# Patient Record
Sex: MALE | Race: WHITE | Employment: FULL TIME | URBAN - METROPOLITAN AREA
[De-identification: names, ages, dates, MRNs, and addresses within clinical notes are randomized per-mention and may not be internally consistent; named-entity substitution may affect disease eponyms.]

---

## 2021-08-19 ENCOUNTER — APPOINTMENT (OUTPATIENT)
Dept: CT IMAGING | Age: 61
DRG: 057 | End: 2021-08-19
Payer: MEDICARE

## 2021-08-19 ENCOUNTER — APPOINTMENT (OUTPATIENT)
Dept: GENERAL RADIOLOGY | Age: 61
DRG: 057 | End: 2021-08-19
Payer: MEDICARE

## 2021-08-19 ENCOUNTER — HOSPITAL ENCOUNTER (INPATIENT)
Age: 61
LOS: 10 days | Discharge: ANOTHER HOSPITAL | DRG: 057 | End: 2021-08-29
Attending: EMERGENCY MEDICINE | Admitting: INTERNAL MEDICINE
Payer: MEDICARE

## 2021-08-19 DIAGNOSIS — R77.8 ELEVATED TROPONIN: ICD-10-CM

## 2021-08-19 DIAGNOSIS — R41.82 ALTERED MENTAL STATUS, UNSPECIFIED ALTERED MENTAL STATUS TYPE: Primary | ICD-10-CM

## 2021-08-19 PROBLEM — R79.89 ELEVATED TROPONIN: Status: ACTIVE | Noted: 2021-08-19

## 2021-08-19 LAB
ACETAMINOPHEN LEVEL: <5 MCG/ML (ref 10–30)
ALBUMIN SERPL-MCNC: 4.1 G/DL (ref 3.5–5.2)
ALP BLD-CCNC: 141 U/L (ref 40–129)
ALT SERPL-CCNC: 32 U/L (ref 0–40)
AMPHETAMINE SCREEN, URINE: NOT DETECTED
ANION GAP SERPL CALCULATED.3IONS-SCNC: 16 MMOL/L (ref 7–16)
AST SERPL-CCNC: 35 U/L (ref 0–39)
BACTERIA: ABNORMAL /HPF
BARBITURATE SCREEN URINE: NOT DETECTED
BASOPHILS ABSOLUTE: 0.03 E9/L (ref 0–0.2)
BASOPHILS RELATIVE PERCENT: 0.4 % (ref 0–2)
BENZODIAZEPINE SCREEN, URINE: NOT DETECTED
BILIRUB SERPL-MCNC: 0.6 MG/DL (ref 0–1.2)
BILIRUBIN URINE: NEGATIVE
BLOOD, URINE: NEGATIVE
BUN BLDV-MCNC: 17 MG/DL (ref 6–23)
CALCIUM SERPL-MCNC: 9.3 MG/DL (ref 8.6–10.2)
CANNABINOID SCREEN URINE: NOT DETECTED
CHLORIDE BLD-SCNC: 98 MMOL/L (ref 98–107)
CK MB: 6.3 NG/ML (ref 0–7.7)
CLARITY: CLEAR
CO2: 22 MMOL/L (ref 22–29)
COCAINE METABOLITE SCREEN URINE: NOT DETECTED
COLOR: YELLOW
CREAT SERPL-MCNC: 0.7 MG/DL (ref 0.7–1.2)
EOSINOPHILS ABSOLUTE: 0.12 E9/L (ref 0.05–0.5)
EOSINOPHILS RELATIVE PERCENT: 1.8 % (ref 0–6)
EPITHELIAL CELLS, UA: ABNORMAL /HPF
ETHANOL: <10 MG/DL (ref 0–0.08)
FENTANYL SCREEN, URINE: NOT DETECTED
GFR AFRICAN AMERICAN: >60
GFR NON-AFRICAN AMERICAN: >60 ML/MIN/1.73
GLUCOSE BLD-MCNC: 112 MG/DL (ref 74–99)
GLUCOSE URINE: NEGATIVE MG/DL
HCT VFR BLD CALC: 42.3 % (ref 37–54)
HEMOGLOBIN: 14.9 G/DL (ref 12.5–16.5)
IMMATURE GRANULOCYTES #: 0.02 E9/L
IMMATURE GRANULOCYTES %: 0.3 % (ref 0–5)
INFLUENZA A: NOT DETECTED
INFLUENZA B: NOT DETECTED
KETONES, URINE: 15 MG/DL
LEUKOCYTE ESTERASE, URINE: ABNORMAL
LYMPHOCYTES ABSOLUTE: 1.92 E9/L (ref 1.5–4)
LYMPHOCYTES RELATIVE PERCENT: 28.5 % (ref 20–42)
Lab: NORMAL
MAGNESIUM: 1.9 MG/DL (ref 1.6–2.6)
MCH RBC QN AUTO: 30.8 PG (ref 26–35)
MCHC RBC AUTO-ENTMCNC: 35.2 % (ref 32–34.5)
MCV RBC AUTO: 87.6 FL (ref 80–99.9)
METER GLUCOSE: 107 MG/DL (ref 74–99)
METHADONE SCREEN, URINE: NOT DETECTED
MONOCYTES ABSOLUTE: 0.52 E9/L (ref 0.1–0.95)
MONOCYTES RELATIVE PERCENT: 7.7 % (ref 2–12)
NEUTROPHILS ABSOLUTE: 4.13 E9/L (ref 1.8–7.3)
NEUTROPHILS RELATIVE PERCENT: 61.3 % (ref 43–80)
NITRITE, URINE: NEGATIVE
OPIATE SCREEN URINE: NOT DETECTED
OXYCODONE URINE: NOT DETECTED
PDW BLD-RTO: 13 FL (ref 11.5–15)
PH UA: 8 (ref 5–9)
PHENCYCLIDINE SCREEN URINE: NOT DETECTED
PLATELET # BLD: 203 E9/L (ref 130–450)
PMV BLD AUTO: 10.4 FL (ref 7–12)
POTASSIUM REFLEX MAGNESIUM: 3.2 MMOL/L (ref 3.5–5)
PROTEIN UA: NEGATIVE MG/DL
RBC # BLD: 4.83 E12/L (ref 3.8–5.8)
RBC UA: ABNORMAL /HPF (ref 0–2)
SALICYLATE, SERUM: <0.3 MG/DL (ref 0–30)
SARS-COV-2 RNA, RT PCR: NOT DETECTED
SODIUM BLD-SCNC: 136 MMOL/L (ref 132–146)
SPECIFIC GRAVITY UA: 1.01 (ref 1–1.03)
TOTAL CK: 566 U/L (ref 20–200)
TOTAL PROTEIN: 7.4 G/DL (ref 6.4–8.3)
TRICYCLIC ANTIDEPRESSANTS SCREEN SERUM: NEGATIVE NG/ML
TROPONIN, HIGH SENSITIVITY: 60 NG/L (ref 0–11)
UROBILINOGEN, URINE: 2 E.U./DL
WBC # BLD: 6.7 E9/L (ref 4.5–11.5)
WBC UA: ABNORMAL /HPF (ref 0–5)

## 2021-08-19 PROCEDURE — 6360000002 HC RX W HCPCS: Performed by: EMERGENCY MEDICINE

## 2021-08-19 PROCEDURE — 87636 SARSCOV2 & INF A&B AMP PRB: CPT

## 2021-08-19 PROCEDURE — 85025 COMPLETE CBC W/AUTO DIFF WBC: CPT

## 2021-08-19 PROCEDURE — 96372 THER/PROPH/DIAG INJ SC/IM: CPT

## 2021-08-19 PROCEDURE — 70450 CT HEAD/BRAIN W/O DYE: CPT

## 2021-08-19 PROCEDURE — 80143 DRUG ASSAY ACETAMINOPHEN: CPT

## 2021-08-19 PROCEDURE — 93005 ELECTROCARDIOGRAM TRACING: CPT | Performed by: EMERGENCY MEDICINE

## 2021-08-19 PROCEDURE — 82550 ASSAY OF CK (CPK): CPT

## 2021-08-19 PROCEDURE — 99284 EMERGENCY DEPT VISIT MOD MDM: CPT

## 2021-08-19 PROCEDURE — 82962 GLUCOSE BLOOD TEST: CPT

## 2021-08-19 PROCEDURE — 81001 URINALYSIS AUTO W/SCOPE: CPT

## 2021-08-19 PROCEDURE — 82077 ASSAY SPEC XCP UR&BREATH IA: CPT

## 2021-08-19 PROCEDURE — 71045 X-RAY EXAM CHEST 1 VIEW: CPT

## 2021-08-19 PROCEDURE — 80053 COMPREHEN METABOLIC PANEL: CPT

## 2021-08-19 PROCEDURE — 82553 CREATINE MB FRACTION: CPT

## 2021-08-19 PROCEDURE — 36415 COLL VENOUS BLD VENIPUNCTURE: CPT

## 2021-08-19 PROCEDURE — 96374 THER/PROPH/DIAG INJ IV PUSH: CPT

## 2021-08-19 PROCEDURE — 84484 ASSAY OF TROPONIN QUANT: CPT

## 2021-08-19 PROCEDURE — 83735 ASSAY OF MAGNESIUM: CPT

## 2021-08-19 PROCEDURE — 80307 DRUG TEST PRSMV CHEM ANLYZR: CPT

## 2021-08-19 PROCEDURE — 80179 DRUG ASSAY SALICYLATE: CPT

## 2021-08-19 PROCEDURE — 2060000000 HC ICU INTERMEDIATE R&B

## 2021-08-19 RX ORDER — POTASSIUM CHLORIDE 20 MEQ/1
40 TABLET, EXTENDED RELEASE ORAL PRN
Status: DISCONTINUED | OUTPATIENT
Start: 2021-08-19 | End: 2021-08-29 | Stop reason: HOSPADM

## 2021-08-19 RX ORDER — SODIUM CHLORIDE 0.9 % (FLUSH) 0.9 %
5-40 SYRINGE (ML) INJECTION EVERY 12 HOURS SCHEDULED
Status: DISCONTINUED | OUTPATIENT
Start: 2021-08-19 | End: 2021-08-29 | Stop reason: HOSPADM

## 2021-08-19 RX ORDER — ONDANSETRON 2 MG/ML
4 INJECTION INTRAMUSCULAR; INTRAVENOUS EVERY 6 HOURS PRN
Status: DISCONTINUED | OUTPATIENT
Start: 2021-08-19 | End: 2021-08-29 | Stop reason: HOSPADM

## 2021-08-19 RX ORDER — ONDANSETRON 4 MG/1
4 TABLET, ORALLY DISINTEGRATING ORAL EVERY 8 HOURS PRN
Status: DISCONTINUED | OUTPATIENT
Start: 2021-08-19 | End: 2021-08-29 | Stop reason: HOSPADM

## 2021-08-19 RX ORDER — HALOPERIDOL 5 MG/ML
5 INJECTION INTRAMUSCULAR ONCE
Status: COMPLETED | OUTPATIENT
Start: 2021-08-19 | End: 2021-08-19

## 2021-08-19 RX ORDER — AMLODIPINE BESYLATE 5 MG/1
5 TABLET ORAL DAILY
Status: DISCONTINUED | OUTPATIENT
Start: 2021-08-20 | End: 2021-08-29 | Stop reason: HOSPADM

## 2021-08-19 RX ORDER — LORAZEPAM 2 MG/ML
1 INJECTION INTRAMUSCULAR ONCE
Status: COMPLETED | OUTPATIENT
Start: 2021-08-19 | End: 2021-08-19

## 2021-08-19 RX ORDER — ACETAMINOPHEN 650 MG/1
650 SUPPOSITORY RECTAL EVERY 6 HOURS PRN
Status: DISCONTINUED | OUTPATIENT
Start: 2021-08-19 | End: 2021-08-29 | Stop reason: HOSPADM

## 2021-08-19 RX ORDER — POTASSIUM CHLORIDE 7.45 MG/ML
10 INJECTION INTRAVENOUS PRN
Status: DISCONTINUED | OUTPATIENT
Start: 2021-08-19 | End: 2021-08-29 | Stop reason: HOSPADM

## 2021-08-19 RX ORDER — NITROGLYCERIN 0.4 MG/1
0.4 TABLET SUBLINGUAL EVERY 5 MIN PRN
Status: DISCONTINUED | OUTPATIENT
Start: 2021-08-19 | End: 2021-08-29 | Stop reason: HOSPADM

## 2021-08-19 RX ORDER — ATORVASTATIN CALCIUM 40 MG/1
40 TABLET, FILM COATED ORAL NIGHTLY
Status: DISCONTINUED | OUTPATIENT
Start: 2021-08-19 | End: 2021-08-29 | Stop reason: HOSPADM

## 2021-08-19 RX ORDER — TRAMADOL HYDROCHLORIDE 50 MG/1
50 TABLET ORAL EVERY 6 HOURS PRN
Status: DISCONTINUED | OUTPATIENT
Start: 2021-08-19 | End: 2021-08-29 | Stop reason: HOSPADM

## 2021-08-19 RX ORDER — SODIUM CHLORIDE 9 MG/ML
25 INJECTION, SOLUTION INTRAVENOUS PRN
Status: DISCONTINUED | OUTPATIENT
Start: 2021-08-19 | End: 2021-08-29 | Stop reason: HOSPADM

## 2021-08-19 RX ORDER — ASPIRIN 81 MG/1
81 TABLET, CHEWABLE ORAL DAILY
Status: DISCONTINUED | OUTPATIENT
Start: 2021-08-20 | End: 2021-08-29 | Stop reason: HOSPADM

## 2021-08-19 RX ORDER — SODIUM CHLORIDE 0.9 % (FLUSH) 0.9 %
10 SYRINGE (ML) INJECTION PRN
Status: DISCONTINUED | OUTPATIENT
Start: 2021-08-19 | End: 2021-08-29 | Stop reason: HOSPADM

## 2021-08-19 RX ORDER — ACETAMINOPHEN 325 MG/1
650 TABLET ORAL EVERY 6 HOURS PRN
Status: DISCONTINUED | OUTPATIENT
Start: 2021-08-19 | End: 2021-08-29 | Stop reason: HOSPADM

## 2021-08-19 RX ADMIN — LORAZEPAM 1 MG: 2 INJECTION INTRAMUSCULAR; INTRAVENOUS at 16:15

## 2021-08-19 RX ADMIN — HALOPERIDOL LACTATE 5 MG: 5 INJECTION, SOLUTION INTRAMUSCULAR at 16:22

## 2021-08-19 ASSESSMENT — PAIN SCALES - GENERAL: PAINLEVEL_OUTOF10: 0

## 2021-08-19 NOTE — LETTER
[address] [place] [address] Dr Department Medicaid  CERTIFICATION OF NECESSITY  FOR NON-EMERGENCY TRANSPORTATION   BY GROUND AMBULANCE      Individual Information   1. Name: Saleem De Gu[facility] 2. [address] [place] [address] Dr Medicaid Billing Number:    3. Address: [address] [address]      Transportation Provider Information   4. Provider Name: pas   5. [address] [place] [address] Dr Medicaid Provider Number:  National Provider Identifier (NPI):      Certification  7. Criteria:  During transport, this individual requires:  [x] Medical treatment or continuous     supervision by an EMT. [] The administration or regulation of oxygen by another person. [] Supervised protective restraint. 8. Period Beginning Date: 2021     9. Length  [x] Not more than 10 day(s)  [] One Year     Additional Information Relevant to Certification   10. Comments or Explanations, If Necessary or Appropriate     AMS  Unsafe to travel unattended. Erratic behavior     Certifying Practitioner Information   11. Name of Practitioner: Lisa Schroeder MD   12. [address] [place] [address] Dr Medicaid Provider Number, If Applicable:  [address] Provider Identifier (NPI):      Signature Information   14. Date of Signature: 2021  15. Name of Person Signing: Demetrio Vivar RN    16. Signature and Professional Designation: Demetrio Vivar RN 2021 0825     [facility] [address]  Rev. 2015       [address] Encounter Date/Time: 2021 [address] Account: [de-identified]    MRN: [id number]    Patient: Criss Black    Contact Serial #: [id number]      ENCOUNTER          Patient Class: I Private Enc?   No Unit RM BD: [address] Service: Med/Surg   Encounter DX: Elevated troponin [R77.8]   ADM Provider: Johnnie Eason MD   Procedure:     ATT Provider: Johnnie Eason MD   REF Provider:        Admission DX: Elevated troponin, Altered mental status, unspecified altered mental status type and DX codes: R77.8, R41.82      PATIENT                 Name: Criss Black : 1960 (61 yrs) Address: [address] [place] [address] Sex: Male   [facility] [address]         Marital Status:    Employer: [employer]         Muslim: None   Primary Care Provider: Romeo Lamar MD         Primary Phone: [phone]   EMERGENCY CONTACT   Contact Name Legal Guardian? Relationship to Patient Home Phone Work Phone   1. Angela Rangel  2. *No Contact Specified*      Spouse    [phone]                 GUARANTOR            Guarantor: Desiree Pabon     : 1960   Address: [address] [place] [address] Sex: Male   [place] [id number]     Relation to Patient: Self       Home Phone: [phone]   Guarantor ID: [id number]       Work Phone: [phone]   Guarantor Employer: [employer]         Status: FULL TIME      COVERAGE        PRIMARY INSURANCE   Payor: [facility] MEDICARE Plan: [facility] MEDICARE COMPLETE   Payor Address: ,          Group Number:   Insurance Type: INDEMNITY   Subscriber Name: Ana Lilia Yarbrough : 1960   Subscriber ID: [id number] Pat. Rel. to Sub: Self   SECONDARY INSURANCE   Payor:   Plan:     Payor Address:  ,           Group Number:   Insurance Type:     Subscriber Name:   Subscriber :     Subscriber ID:   Pat.  Rel. to Sub:

## 2021-08-19 NOTE — ED NOTES
Pt refusing to wear the monitor. When placed on his arm he takes it right off.      Martha Ledesma RN  08/19/21 9268

## 2021-08-19 NOTE — ED PROVIDER NOTES
HPI:  8/19/21,   Time: 12:43 PM EDT       Altaf Silverman is a 64 y.o. male presenting to the ED for ams/not acting himself, beginning unk ago. The complaint has been persistent, moderate in severity, and worsened by nothing. Ams/odd behavior. Pt hx musc dystrophy. Pt poor historian, hx per ems. No fever/chills/sweats/n/v/d. Family reports statements of hurting others    Review of Systems:   Pertinent positives and negatives are stated within HPI, all other systems reviewed and are negative.          --------------------------------------------- PAST HISTORY ---------------------------------------------  Past Medical History:  has a past medical history of Hypertension, MD (muscular dystrophy) ([facility] [address] and Obesity (BMI 35.0-39.9 without comorbidity). Past Surgical History:  has a past surgical history that includes cyst removal.    Social History:  reports that he has never smoked. He has never used smokeless tobacco. He reports current alcohol use. He reports that he does not use drugs. Family History: family history is not on file. The patients home medications have been reviewed. Allergies: Hydrocodone        ---------------------------------------------------PHYSICAL EXAM--------------------------------------    Constitutional/General: Alert and oriented x1, well appearing, non toxic in NAD  Head: Normocephalic and atraumatic  Eyes: PERRL, EOMI, conjunctive normal, sclera non icteric  Mouth: Oropharynx clear, handling secretions,  Neck: Supple, full ROM,   Respiratory: Lungs clear to auscultation bilaterally, no wheezes, rales, or rhonchi. Not in respiratory distress  Cardiovascular:  Regular rate. Regular rhythm. No murmurs, gallops, or rubs. 2+ distal pulses  Chest: No chest wall tenderness  GI:  Abdomen Soft, Non tender, Non distended. Musculoskeletal: Moves all extremities x 4. Warm and well perfused, no clubbing, cyanosis, or edema.  Capillary refill <3 seconds  Integument: skin warm and dry. No rashes. Lymphatic: no lymphadenopathy noted  Neurologic: GCS 14, no focal deficits, symmetric strength 5/5 in the upper and lower extremities bilaterally  Psychiatric: colorful Affect    -------------------------------------------------- RESULTS -------------------------------------------------  I have personally reviewed all laboratory and imaging results for this patient. Results are listed below.      LABS:  Results for orders placed or performed during the hospital encounter of 08/19/21   COVID-19 & Influenza Combo    Specimen: Nasopharyngeal Swab   Result Value Ref Range    SARS-CoV-2 RNA, RT PCR NOT DETECTED NOT DETECTED    INFLUENZA A NOT DETECTED NOT DETECTED    INFLUENZA B NOT DETECTED NOT DETECTED   Culture, CSF    Specimen: CSF   Result Value Ref Range    CSF Culture Growth not present, incubation continues     Gram Stain Result Refer to ordered Gram stain for results    Meningitis Encephalitis Panel CSF, Molecular   Result Value Ref Range    Cryptococcus neoformans/gattii CSF by PCR Not Detected Not Detected    Cytomegalovirus CSF by PCR Not Detected Not Detected    Enterovirus CSF by PCR Not Detected Not Detected    Escherichia coli K1 CSF by PCR Not Detected Not Detected    Haemophilus influenzae CSF by PCR Not Detected Not Detected    Herpes simplex virus 1 CSF by PCR Not Detected Not Detected    Herpes simplex virus 2 CSF by PCR Not Detected Not Detected    Human herpesvirus 6 CSF by PCR Not Detected Not Detected    Human parechovirus CSF by PCR Not Detected Not Detected    Listeria monocytogenes CSF by PCR Not Detected Not Detected    Neisseria meningitidis CSF by PCR Not Detected Not Detected    Streptococcus agalactiae CSF by PCR Not Detected Not Detected    Streptococcus pneumoniae CSF by PCR Not Detected Not Detected    Varicella zoster virus CSF by PCR Not Detected Not Detected   Gram Stain    Specimen: CSF   Result Value Ref Range    Gram Stain Orderable       Gram stain performed from cytospun specimen  Polymorphonuclear leukocytes not seen  Epithelial cells not seen  No organisms seen     COVID-19, Rapid    Specimen: Nasopharyngeal Swab; Nose   Result Value Ref Range    SARS-CoV-2, NAAT Not Detected Not Detected   CBC Auto Differential   Result Value Ref Range    WBC 6.7 4.5 - 11.5 E9/L    RBC 4.83 3.80 - 5.80 E12/L    Hemoglobin 14.9 12.5 - 16.5 g/dL    Hematocrit 42.3 37.0 - 54.0 %    MCV 87.6 80.0 - 99.9 fL    MCH 30.8 26.0 - 35.0 pg    MCHC 35.2 (H) 32.0 - 34.5 %    RDW 13.0 11.5 - 15.0 fL    Platelets 055 666 - 288 E9/L    MPV 10.4 7.0 - 12.0 fL    Neutrophils % 61.3 43.0 - 80.0 %    Immature Granulocytes % 0.3 0.0 - 5.0 %    Lymphocytes % 28.5 20.0 - 42.0 %    Monocytes % 7.7 2.0 - 12.0 %    Eosinophils % 1.8 0.0 - 6.0 %    Basophils % 0.4 0.0 - 2.0 %    Neutrophils Absolute 4.13 1.80 - 7.30 E9/L    Immature Granulocytes # 0.02 E9/L    Lymphocytes Absolute 1.92 1.50 - 4.00 E9/L    Monocytes Absolute 0.52 0.10 - 0.95 E9/L    Eosinophils Absolute 0.12 0.05 - 0.50 E9/L    Basophils Absolute 0.03 0.00 - 0.20 E9/L   Comprehensive Metabolic Panel w/ Reflex to MG   Result Value Ref Range    Sodium 136 132 - 146 mmol/L    Potassium reflex Magnesium 3.2 (L) 3.5 - 5.0 mmol/L    Chloride 98 98 - 107 mmol/L    CO2 22 22 - 29 mmol/L    Anion Gap 16 7 - 16 mmol/L    Glucose 112 (H) 74 - 99 mg/dL    BUN 17 6 - 23 mg/dL    CREATININE 0.7 0.7 - 1.2 mg/dL    GFR Non-African American >60 >=60 mL/min/1.73    GFR African American >60     Calcium 9.3 8.6 - 10.2 mg/dL    Total Protein 7.4 6.4 - 8.3 g/dL    Albumin 4.1 3.5 - 5.2 g/dL    Total Bilirubin 0.6 0.0 - 1.2 mg/dL    Alkaline Phosphatase 141 (H) 40 - 129 U/L    ALT 32 0 - 40 U/L    AST 35 0 - 39 U/L   Troponin   Result Value Ref Range    Troponin, High Sensitivity 60 (H) 0 - 11 ng/L   Urinalysis, reflex to microscopic   Result Value Ref Range    Color, UA Yellow Straw/Yellow    Clarity, UA Clear Clear    Glucose, Ur Negative Negative mg/dL Bilirubin Urine Negative Negative    Ketones, Urine 15 (A) Negative mg/dL    Specific Gravity, UA 1.015 1.005 - 1.030    Blood, Urine Negative Negative    pH, UA 8.0 5.0 - 9.0    Protein, UA Negative Negative mg/dL    Urobilinogen, Urine 2.0 (A) <2.0 E.U./dL    Nitrite, Urine Negative Negative    Leukocyte Esterase, Urine TRACE (A) Negative   Urine Drug Screen   Result Value Ref Range    Amphetamine Screen, Urine NOT DETECTED Negative <1000 ng/mL    Barbiturate Screen, Ur NOT DETECTED Negative < 200 ng/mL    Benzodiazepine Screen, Urine NOT DETECTED Negative < 200 ng/mL    Cannabinoid Scrn, Ur NOT DETECTED Negative < 50ng/mL    Cocaine Metabolite Screen, Urine NOT DETECTED Negative < 300 ng/mL    Opiate Scrn, Ur NOT DETECTED Negative < 300ng/mL    PCP Screen, Urine NOT DETECTED Negative < 25 ng/mL    Methadone Screen, Urine NOT DETECTED Negative <300 ng/mL    Oxycodone Urine NOT DETECTED Negative <100 ng/mL    FENTANYL SCREEN, URINE NOT DETECTED Negative <1 ng/mL    Drug Screen Comment: see below    Serum Drug Screen   Result Value Ref Range    Ethanol Lvl <10 mg/dL    Acetaminophen Level <5.0 (L) 10.0 - 92.2 mcg/mL    Salicylate, Serum <5.6 0.0 - 30.0 mg/dL    TCA Scrn NEGATIVE Cutoff:300 ng/mL   Magnesium   Result Value Ref Range    Magnesium 1.9 1.6 - 2.6 mg/dL   CK MB   Result Value Ref Range    CK-MB 6.3 0.0 - 7.7 ng/mL   CK   Result Value Ref Range    Total  (H) 20 - 200 U/L   Microscopic Urinalysis   Result Value Ref Range    WBC, UA 1-3 0 - 5 /HPF    RBC, UA NONE 0 - 2 /HPF    Epithelial Cells, UA RARE /HPF    Bacteria, UA RARE (A) None Seen /HPF   Troponin   Result Value Ref Range    Troponin, High Sensitivity 66 (H) 0 - 11 ng/L   Basic Metabolic Panel w/ Reflex to MG   Result Value Ref Range    Sodium 137 132 - 146 mmol/L    Potassium reflex Magnesium 3.7 3.5 - 5.0 mmol/L    Chloride 100 98 - 107 mmol/L    CO2 28 22 - 29 mmol/L    Anion Gap 9 7 - 16 mmol/L    Glucose 97 74 - 99 mg/dL    BUN 17 6 - 23 mg/dL    CREATININE 0.7 0.7 - 1.2 mg/dL    GFR Non-African American >60 >=60 mL/min/1.73    GFR African American >60     Calcium 9.1 8.6 - 10.2 mg/dL   Hemoglobin A1c   Result Value Ref Range    Hemoglobin A1C 6.1 (H) 4.0 - 5.6 %   CBC   Result Value Ref Range    WBC 6.4 4.5 - 11.5 E9/L    RBC 4.81 3.80 - 5.80 E12/L    Hemoglobin 14.9 12.5 - 16.5 g/dL    Hematocrit 43.5 37.0 - 54.0 %    MCV 90.4 80.0 - 99.9 fL    MCH 31.0 26.0 - 35.0 pg    MCHC 34.3 32.0 - 34.5 %    RDW 13.2 11.5 - 15.0 fL    Platelets 834 451 - 552 E9/L    MPV 10.4 7.0 - 12.0 fL   Lipid panel - fasting   Result Value Ref Range    Cholesterol, Total 150 0 - 199 mg/dL    Triglycerides 139 0 - 149 mg/dL    HDL 44 >40 mg/dL    LDL Calculated 78 0 - 99 mg/dL    VLDL Cholesterol Calculated 28 mg/dL   CK   Result Value Ref Range    Total  (H) 20 - 200 U/L   D-dimer, quantitative   Result Value Ref Range    D-Dimer, Quant 326 ng/mL DDU   Vitamin B12 & folate   Result Value Ref Range    Vitamin B-12 347 211 - 946 pg/mL    Folate 9.9 4.8 - 24.2 ng/mL   TSH without Reflex   Result Value Ref Range    TSH 1.270 0.270 - 4.200 uIU/mL   Sedimentation Rate   Result Value Ref Range    Sed Rate 16 (H) 0 - 15 mm/Hr   C-reactive protein   Result Value Ref Range    CRP 1.4 (H) 0.0 - 0.4 mg/dL   GOMEZ   Result Value Ref Range    GOMEZ NEGATIVE NEGATIVE   Miscellaneous sendout 1   Result Value Ref Range    test code AUTO [facility] PN     This Test Sent To [facility]    Comprehensive Metabolic Panel   Result Value Ref Range    Sodium 136 132 - 146 mmol/L    Potassium 3.4 (L) 3.5 - 5.0 mmol/L    Chloride 99 98 - 107 mmol/L    CO2 26 22 - 29 mmol/L    Anion Gap 11 7 - 16 mmol/L    Glucose 105 (H) 74 - 99 mg/dL    BUN 19 6 - 23 mg/dL    CREATININE 0.6 (L) 0.7 - 1.2 mg/dL    GFR Non-African American >60 >=60 mL/min/1.73    GFR African American >60     Calcium 9.0 8.6 - 10.2 mg/dL    Total Protein 7.1 6.4 - 8.3 g/dL    Albumin 3.8 3.5 - 5.2 g/dL    Total Bilirubin 0.5 0.0 - 1.2 mg/dL Alkaline Phosphatase 132 (H) 40 - 129 U/L    ALT 34 0 - 40 U/L    AST 33 0 - 39 U/L   CBC Auto Differential   Result Value Ref Range    WBC 7.2 4.5 - 11.5 E9/L    RBC 4.90 3.80 - 5.80 E12/L    Hemoglobin 15.2 12.5 - 16.5 g/dL    Hematocrit 43.7 37.0 - 54.0 %    MCV 89.2 80.0 - 99.9 fL    MCH 31.0 26.0 - 35.0 pg    MCHC 34.8 (H) 32.0 - 34.5 %    RDW 13.3 11.5 - 15.0 fL    Platelets 103 626 - 102 E9/L    MPV 10.4 7.0 - 12.0 fL    Neutrophils % 74.1 43.0 - 80.0 %    Immature Granulocytes % 0.6 0.0 - 5.0 %    Lymphocytes % 17.5 (L) 20.0 - 42.0 %    Monocytes % 6.2 2.0 - 12.0 %    Eosinophils % 1.2 0.0 - 6.0 %    Basophils % 0.4 0.0 - 2.0 %    Neutrophils Absolute 5.34 1.80 - 7.30 E9/L    Immature Granulocytes # 0.04 E9/L    Lymphocytes Absolute 1.26 (L) 1.50 - 4.00 E9/L    Monocytes Absolute 0.45 0.10 - 0.95 E9/L    Eosinophils Absolute 0.09 0.05 - 0.50 E9/L    Basophils Absolute 0.03 0.00 - 0.20 E9/L   Glucose CSF   Result Value Ref Range    Glucose, CSF 75 (H) 40 - 70 mg/dL   Protein CSF   Result Value Ref Range    Protein, CSF 22 15 - 40 mg/dL   CSF cell count with differential   Result Value Ref Range    Color, CSF Colorless     Appearance, CSF Clear Clear    Tube Number + CELL CT + DIFF-CSF Tube 2     WBC, CSF <3 0 - 2 /uL    RBC, CSF <2000 /uL    Neutrophils, CSF 50 (H) 0 - 10 %    Monocytes, CSF 50 10 - 70 %   Lyme Disease Acute Reflexive Panel   Result Value Ref Range    Lyme, EIA 0.21 0.00 - 1.20 [place]   Platelet count   Result Value Ref Range    Platelets 116 303 - 672 R4/O   Basic metabolic panel   Result Value Ref Range    Sodium 138 132 - 146 mmol/L    Potassium 3.7 3.5 - 5.0 mmol/L    Chloride 100 98 - 107 mmol/L    CO2 26 22 - 29 mmol/L    Anion Gap 12 7 - 16 mmol/L    Glucose 101 (H) 74 - 99 mg/dL    BUN 23 6 - 23 mg/dL    CREATININE 0.7 0.7 - 1.2 mg/dL    GFR Non-African American >60 >=60 mL/min/1.73    GFR African American >60     Calcium 9.5 8.6 - 10.2 mg/dL   PROTIME-INR   Result Value Ref Range Protime 11.6 9.3 - 12.4 sec    INR 1.1    SPECIMEN REJECTION   Result Value Ref Range    Rejected Test PT     Reason for Rejection see below    Miscellaneous Sendout 1   Result Value Ref Range    test code CRYPTO CSF    Miscellaneous Sendout 1   Result Value Ref Range    test code Cryptococcus SE     This Test Sent To [facility]    CBC Auto Differential   Result Value Ref Range    WBC 15.6 (H) 4.5 - 11.5 E9/L    RBC 4.87 3.80 - 5.80 E12/L    Hemoglobin 15.0 12.5 - 16.5 g/dL    Hematocrit 42.9 37.0 - 54.0 %    MCV 88.1 80.0 - 99.9 fL    MCH 30.8 26.0 - 35.0 pg    MCHC 35.0 (H) 32.0 - 34.5 %    RDW 13.3 11.5 - 15.0 fL    Platelets 175 740 - 744 E9/L    MPV 10.1 7.0 - 12.0 fL    Neutrophils % 86.2 (H) 43.0 - 80.0 %    Immature Granulocytes % 0.9 0.0 - 5.0 %    Lymphocytes % 7.4 (L) 20.0 - 42.0 %    Monocytes % 5.4 2.0 - 12.0 %    Eosinophils % 0.0 0.0 - 6.0 %    Basophils % 0.1 0.0 - 2.0 %    Neutrophils Absolute 13.49 (H) 1.80 - 7.30 E9/L    Immature Granulocytes # 0.14 E9/L    Lymphocytes Absolute 1.15 (L) 1.50 - 4.00 E9/L    Monocytes Absolute 0.85 0.10 - 0.95 E9/L    Eosinophils Absolute 0.00 (L) 0.05 - 0.50 E9/L    Basophils Absolute 0.01 0.00 - 0.20 E9/L   Comprehensive Metabolic Panel   Result Value Ref Range    Sodium 138 132 - 146 mmol/L    Potassium 3.5 3.5 - 5.0 mmol/L    Chloride 101 98 - 107 mmol/L    CO2 24 22 - 29 mmol/L    Anion Gap 13 7 - 16 mmol/L    Glucose 207 (H) 74 - 99 mg/dL    BUN 32 (H) 6 - 23 mg/dL    CREATININE 0.7 0.7 - 1.2 mg/dL    GFR Non-African American >60 >=60 mL/min/1.73    GFR African American >60     Calcium 9.3 8.6 - 10.2 mg/dL    Total Protein 7.0 6.4 - 8.3 g/dL    Albumin 3.9 3.5 - 5.2 g/dL    Total Bilirubin 0.4 0.0 - 1.2 mg/dL    Alkaline Phosphatase 117 40 - 129 U/L    ALT 32 0 - 40 U/L    AST 27 0 - 39 U/L   Ferritin   Result Value Ref Range    Ferritin 364 ng/mL   POCT Glucose   Result Value Ref Range    Meter Glucose 107 (H) 74 - 99 mg/dL   EKG 12 Lead   Result Value Ref Range Ventricular Rate 70 BPM    Atrial Rate 70 BPM    P-R Interval 174 ms    QRS Duration 94 ms    Q-T Interval 412 ms    QTc Calculation (Bazett) 444 ms    P Axis 41 degrees    R Axis -46 degrees    T Axis -10 degrees   EKG 12 lead   Result Value Ref Range    Ventricular Rate 64 BPM    Atrial Rate 64 BPM    P-R Interval 156 ms    QRS Duration 106 ms    Q-T Interval 426 ms    QTc Calculation (Bazett) 439 ms    P Axis 36 degrees    R Axis -48 degrees    T Axis 119 degrees       RADIOLOGY:  Interpreted by Radiologist.  MRI BRAIN W WO CONTRAST   Final Result   1. No acute intracranial abnormality. 2. No mass, mass effect, edema or hemorrhage. NM Cardiac Stress Test Nuclear Imaging   Final Result   1. ECG during the infusion did not change. 2.  The myocardial perfusion imaging was normal without ischemia or   infarct. 3.  Overall left ventricular systolic function was normal without   regional wall motion abnormalities. 4.  No transient ischemic dilatation. 5. Low risk general pharmacologic stress test.      Thank you for sending your patient to this [employer]. Arian Back MD, [facility] [place], [address] [place] [address] cardiology. CT Head WO Contrast   Final Result   No acute intracranial abnormality. No significant changes since the March 10, 2011 study. XR CHEST PORTABLE   Final Result   The evaluation is limited due to low lung volumes. No acute pulmonary process             EKG:  This EKG is signed and interpreted by the EP. Time: 1314  Rate: 70  Rhythm: Sinus  Interpretation: non-specific EKG  Comparison: None      ------------------------- NURSING NOTES AND VITALS REVIEWED ---------------------------   The nursing notes within the ED encounter and vital signs as below have been reviewed by myself.   BP (!) 156/77   Pulse 88   Temp 97.7 °F (36.5 °C) (Temporal)   Resp 18   Ht 5' 11\" (1.803 m)   Wt 280 lb 6.4 oz (127.2 kg)   SpO2 95%   BMI 39.11 kg/m²   Oxygen Saturation Interpretation: Normal    The patients available past medical records and past encounters were reviewed.         ------------------------------ ED COURSE/MEDICAL DECISION MAKING----------------------  Medications   amLODIPine (NORVASC) tablet 5 mg (5 mg Oral Given 8/24/21 0823)   traMADol (ULTRAM) tablet 50 mg (50 mg Oral Given 8/24/21 1037)   sodium chloride flush 0.9 % injection 5-40 mL ( IntraVENous Canceled Entry 8/24/21 2032)   sodium chloride flush 0.9 % injection 10 mL (has no administration in time range)   0.9 % sodium chloride infusion (has no administration in time range)   ondansetron (ZOFRAN-ODT) disintegrating tablet 4 mg (has no administration in time range)     Or   ondansetron (ZOFRAN) injection 4 mg (has no administration in time range)   acetaminophen (TYLENOL) tablet 650 mg (has no administration in time range)     Or   acetaminophen (TYLENOL) suppository 650 mg (has no administration in time range)   magnesium hydroxide (MILK OF MAGNESIA) 400 MG/5ML suspension 30 mL (has no administration in time range)   aspirin chewable tablet 81 mg (81 mg Oral Given 8/24/21 0822)   enoxaparin (LOVENOX) injection 40 mg (40 mg Subcutaneous Given 8/24/21 0822)   atorvastatin (LIPITOR) tablet 40 mg (40 mg Oral Given 8/24/21 2028)   potassium chloride (KLOR-CON M) extended release tablet 40 mEq ( Oral See Alternative 8/21/21 0905)     Or   potassium bicarb-citric acid (EFFER-K) effervescent tablet 40 mEq (40 mEq Oral Given 8/21/21 0905)     Or   potassium chloride 10 mEq/100 mL IVPB (Peripheral Line) ( IntraVENous See Alternative 8/21/21 0905)   nitroGLYCERIN (NITROSTAT) SL tablet 0.4 mg (has no administration in time range)   QUEtiapine (SEROQUEL) tablet 25 mg (25 mg Oral Given 8/24/21 2150)   sodium chloride flush 0.9 % injection 5-40 mL (10 mLs IntraVENous Given 8/24/21 2028)   sodium chloride flush 0.9 % injection 5-40 mL (has no administration in time range)   0.9 % sodium chloride infusion (has no administration in time range)   vitamin B-12 (CYANOCOBALAMIN) tablet 1,000 mcg (1,000 mcg Oral Given 8/24/21 0823)   methylPREDNISolone sodium (SOLU-MEDROL) 1,000 mg in sodium chloride 0.9 % 250 mL IVPB (0 mg IntraVENous Stopped 8/24/21 0855)   LORazepam (ATIVAN) injection 1 mg (1 mg IntraVENous Given 8/19/21 1615)   haloperidol lactate (HALDOL) injection 5 mg (5 mg Intramuscular Given 8/19/21 1622)   potassium chloride (KLOR-CON M) extended release tablet 20 mEq (20 mEq Oral Given 8/20/21 0937)   magnesium sulfate 1000 mg in dextrose 5% 100 mL IVPB (0 mg IntraVENous Stopped 8/20/21 1038)   regadenoson (LEXISCAN) injection 0.4 mg (0.4 mg IntraVENous Given 8/20/21 1304)   technetium sestamibi (CARDIOLITE) injection 71.9 millicurie (90.6 millicuries IntraVENous Given 8/20/21 1152)   technetium sestamibi (CARDIOLITE) injection 35 millicurie (35 millicuries IntraVENous Given 8/20/21 1306)   gadoteridol (PROHANCE) injection 20 mL (20 mLs IntraVENous Given 8/20/21 1813)         ED COURSE:       Medical Decision Making:    ? Psych, chemical restraints given due to agitation, trop elevated, will admit medically for further care      This patient's ED course included: a personal history and physicial examination    This patient has remained hemodynamically stable during their ED course. Re-Evaluations:             Re-evaluation. Patients symptoms are improving    Re-examination  8/19/21   2:43 PM EDT          Vital Signs:   Vitals:    08/24/21 1037 08/24/21 1545 08/25/21 0000 08/25/21 0542   BP:  125/74 (!) 156/77    Pulse:  92 88    Resp:  18 18    Temp:  98.2 °F (36.8 °C) 97.7 °F (36.5 °C)    TempSrc:  Temporal Temporal    SpO2: 94%  95%    Weight:    280 lb 6.4 oz (127.2 kg)   Height:         Card/Pulm:  Rhythm: normal rate. Heart Sounds: no murmurs, gallops, or rubs. clear to auscultation, no wheezes or rales and unlabored breathing.     Capillary Refill: normal.  Radial Pulse:  equal.  Skin: Warm.        Consultations:             [facility]    Critical Care: 35 min        Counseling: The emergency provider has spoken with the spouse/SO and discussed todays results, in addition to providing specific details for the plan of care and counseling regarding the diagnosis and prognosis. Questions are answered at this time and they are agreeable with the plan.       --------------------------------- IMPRESSION AND DISPOSITION ---------------------------------    IMPRESSION  1. Altered mental status, unspecified altered mental status type    2. Elevated troponin        DISPOSITION  Disposition: Admit to telemetry  Patient condition is stable    NOTE: This report was transcribed using voice recognition software.  Every effort was made to ensure accuracy; however, inadvertent computerized transcription errors may be present        Helene Hong MD  08/25/21 4645

## 2021-08-20 ENCOUNTER — APPOINTMENT (OUTPATIENT)
Dept: MRI IMAGING | Age: 61
DRG: 057 | End: 2021-08-20
Payer: MEDICARE

## 2021-08-20 ENCOUNTER — APPOINTMENT (OUTPATIENT)
Dept: NEUROLOGY | Age: 61
DRG: 057 | End: 2021-08-20
Payer: MEDICARE

## 2021-08-20 ENCOUNTER — APPOINTMENT (OUTPATIENT)
Dept: NUCLEAR MEDICINE | Age: 61
DRG: 057 | End: 2021-08-20
Payer: MEDICARE

## 2021-08-20 PROBLEM — E66.9 OBESITY (BMI 35.0-39.9 WITHOUT COMORBIDITY): Chronic | Status: ACTIVE | Noted: 2021-08-20

## 2021-08-20 PROBLEM — R41.82 ALTERED MENTAL STATUS: Status: ACTIVE | Noted: 2021-08-20

## 2021-08-20 LAB
ANION GAP SERPL CALCULATED.3IONS-SCNC: 9 MMOL/L (ref 7–16)
BUN BLDV-MCNC: 17 MG/DL (ref 6–23)
C-REACTIVE PROTEIN: 1.4 MG/DL (ref 0–0.4)
CALCIUM SERPL-MCNC: 9.1 MG/DL (ref 8.6–10.2)
CHLORIDE BLD-SCNC: 100 MMOL/L (ref 98–107)
CHOLESTEROL, TOTAL: 150 MG/DL (ref 0–199)
CO2: 28 MMOL/L (ref 22–29)
CREAT SERPL-MCNC: 0.7 MG/DL (ref 0.7–1.2)
D DIMER: 326 NG/ML DDU
EKG ATRIAL RATE: 64 BPM
EKG ATRIAL RATE: 70 BPM
EKG P AXIS: 36 DEGREES
EKG P AXIS: 41 DEGREES
EKG P-R INTERVAL: 156 MS
EKG P-R INTERVAL: 174 MS
EKG Q-T INTERVAL: 412 MS
EKG Q-T INTERVAL: 426 MS
EKG QRS DURATION: 106 MS
EKG QRS DURATION: 94 MS
EKG QTC CALCULATION (BAZETT): 439 MS
EKG QTC CALCULATION (BAZETT): 444 MS
EKG R AXIS: -46 DEGREES
EKG R AXIS: -48 DEGREES
EKG T AXIS: -10 DEGREES
EKG T AXIS: 119 DEGREES
EKG VENTRICULAR RATE: 64 BPM
EKG VENTRICULAR RATE: 70 BPM
FOLATE: 9.9 NG/ML (ref 4.8–24.2)
GFR AFRICAN AMERICAN: >60
GFR NON-AFRICAN AMERICAN: >60 ML/MIN/1.73
GLUCOSE BLD-MCNC: 97 MG/DL (ref 74–99)
HBA1C MFR BLD: 6.1 % (ref 4–5.6)
HCT VFR BLD CALC: 43.5 % (ref 37–54)
HDLC SERPL-MCNC: 44 MG/DL
HEMOGLOBIN: 14.9 G/DL (ref 12.5–16.5)
LDL CHOLESTEROL CALCULATED: 78 MG/DL (ref 0–99)
LV EF: 82 %
LVEF MODALITY: NORMAL
MCH RBC QN AUTO: 31 PG (ref 26–35)
MCHC RBC AUTO-ENTMCNC: 34.3 % (ref 32–34.5)
MCV RBC AUTO: 90.4 FL (ref 80–99.9)
PDW BLD-RTO: 13.2 FL (ref 11.5–15)
PLATELET # BLD: 160 E9/L (ref 130–450)
PMV BLD AUTO: 10.4 FL (ref 7–12)
POTASSIUM REFLEX MAGNESIUM: 3.7 MMOL/L (ref 3.5–5)
RBC # BLD: 4.81 E12/L (ref 3.8–5.8)
SEDIMENTATION RATE, ERYTHROCYTE: 16 MM/HR (ref 0–15)
SODIUM BLD-SCNC: 137 MMOL/L (ref 132–146)
TOTAL CK: 439 U/L (ref 20–200)
TRIGL SERPL-MCNC: 139 MG/DL (ref 0–149)
TROPONIN, HIGH SENSITIVITY: 66 NG/L (ref 0–11)
TSH SERPL DL<=0.05 MIU/L-ACNC: 1.27 UIU/ML (ref 0.27–4.2)
VITAMIN B-12: 347 PG/ML (ref 211–946)
VLDLC SERPL CALC-MCNC: 28 MG/DL
WBC # BLD: 6.4 E9/L (ref 4.5–11.5)

## 2021-08-20 PROCEDURE — 93018 CV STRESS TEST I&R ONLY: CPT | Performed by: INTERNAL MEDICINE

## 2021-08-20 PROCEDURE — 99223 1ST HOSP IP/OBS HIGH 75: CPT | Performed by: INTERNAL MEDICINE

## 2021-08-20 PROCEDURE — 93010 ELECTROCARDIOGRAM REPORT: CPT | Performed by: INTERNAL MEDICINE

## 2021-08-20 PROCEDURE — 85378 FIBRIN DEGRADE SEMIQUANT: CPT

## 2021-08-20 PROCEDURE — 93017 CV STRESS TEST TRACING ONLY: CPT

## 2021-08-20 PROCEDURE — 83519 RIA NONANTIBODY: CPT

## 2021-08-20 PROCEDURE — 80061 LIPID PANEL: CPT

## 2021-08-20 PROCEDURE — 86800 THYROGLOBULIN ANTIBODY: CPT

## 2021-08-20 PROCEDURE — 6370000000 HC RX 637 (ALT 250 FOR IP): Performed by: FAMILY MEDICINE

## 2021-08-20 PROCEDURE — 82746 ASSAY OF FOLIC ACID SERUM: CPT

## 2021-08-20 PROCEDURE — 80048 BASIC METABOLIC PNL TOTAL CA: CPT

## 2021-08-20 PROCEDURE — 86703 HIV-1/HIV-2 1 RESULT ANTBDY: CPT

## 2021-08-20 PROCEDURE — 6360000002 HC RX W HCPCS: Performed by: FAMILY MEDICINE

## 2021-08-20 PROCEDURE — 6360000002 HC RX W HCPCS: Performed by: INTERNAL MEDICINE

## 2021-08-20 PROCEDURE — 83036 HEMOGLOBIN GLYCOSYLATED A1C: CPT

## 2021-08-20 PROCEDURE — 2580000003 HC RX 258: Performed by: FAMILY MEDICINE

## 2021-08-20 PROCEDURE — 95816 EEG AWAKE AND DROWSY: CPT

## 2021-08-20 PROCEDURE — 36415 COLL VENOUS BLD VENIPUNCTURE: CPT

## 2021-08-20 PROCEDURE — 78452 HT MUSCLE IMAGE SPECT MULT: CPT | Performed by: INTERNAL MEDICINE

## 2021-08-20 PROCEDURE — 86038 ANTINUCLEAR ANTIBODIES: CPT

## 2021-08-20 PROCEDURE — 78452 HT MUSCLE IMAGE SPECT MULT: CPT

## 2021-08-20 PROCEDURE — 87327 CRYPTOCOCCUS NEOFORM AG IA: CPT

## 2021-08-20 PROCEDURE — A9500 TC99M SESTAMIBI: HCPCS | Performed by: RADIOLOGY

## 2021-08-20 PROCEDURE — 85651 RBC SED RATE NONAUTOMATED: CPT

## 2021-08-20 PROCEDURE — 85027 COMPLETE CBC AUTOMATED: CPT

## 2021-08-20 PROCEDURE — 82607 VITAMIN B-12: CPT

## 2021-08-20 PROCEDURE — 6370000000 HC RX 637 (ALT 250 FOR IP): Performed by: INTERNAL MEDICINE

## 2021-08-20 PROCEDURE — 84443 ASSAY THYROID STIM HORMONE: CPT

## 2021-08-20 PROCEDURE — 70553 MRI BRAIN STEM W/O & W/DYE: CPT

## 2021-08-20 PROCEDURE — 6360000004 HC RX CONTRAST MEDICATION: Performed by: RADIOLOGY

## 2021-08-20 PROCEDURE — 93005 ELECTROCARDIOGRAM TRACING: CPT | Performed by: FAMILY MEDICINE

## 2021-08-20 PROCEDURE — 86341 ISLET CELL ANTIBODY: CPT

## 2021-08-20 PROCEDURE — A9579 GAD-BASE MR CONTRAST NOS,1ML: HCPCS | Performed by: RADIOLOGY

## 2021-08-20 PROCEDURE — 3430000000 HC RX DIAGNOSTIC RADIOPHARMACEUTICAL: Performed by: RADIOLOGY

## 2021-08-20 PROCEDURE — 93016 CV STRESS TEST SUPVJ ONLY: CPT | Performed by: INTERNAL MEDICINE

## 2021-08-20 PROCEDURE — 86618 LYME DISEASE ANTIBODY: CPT

## 2021-08-20 PROCEDURE — 99222 1ST HOSP IP/OBS MODERATE 55: CPT | Performed by: PSYCHIATRY & NEUROLOGY

## 2021-08-20 PROCEDURE — 86140 C-REACTIVE PROTEIN: CPT

## 2021-08-20 PROCEDURE — 82550 ASSAY OF CK (CPK): CPT

## 2021-08-20 PROCEDURE — 83516 IMMUNOASSAY NONANTIBODY: CPT

## 2021-08-20 PROCEDURE — 86255 FLUORESCENT ANTIBODY SCREEN: CPT

## 2021-08-20 PROCEDURE — 2060000000 HC ICU INTERMEDIATE R&B

## 2021-08-20 RX ORDER — MAGNESIUM SULFATE 1 G/100ML
1000 INJECTION INTRAVENOUS ONCE
Status: COMPLETED | OUTPATIENT
Start: 2021-08-20 | End: 2021-08-20

## 2021-08-20 RX ORDER — POTASSIUM CHLORIDE 20 MEQ/1
20 TABLET, EXTENDED RELEASE ORAL ONCE
Status: COMPLETED | OUTPATIENT
Start: 2021-08-20 | End: 2021-08-20

## 2021-08-20 RX ORDER — QUETIAPINE FUMARATE 25 MG/1
25 TABLET, FILM COATED ORAL 2 TIMES DAILY PRN
Status: DISCONTINUED | OUTPATIENT
Start: 2021-08-20 | End: 2021-08-29 | Stop reason: HOSPADM

## 2021-08-20 RX ADMIN — ASPIRIN 81 MG CHEWABLE TABLET 81 MG: 81 TABLET CHEWABLE at 08:18

## 2021-08-20 RX ADMIN — POTASSIUM CHLORIDE 20 MEQ: 1500 TABLET, EXTENDED RELEASE ORAL at 09:37

## 2021-08-20 RX ADMIN — Medication 10.7 MILLICURIE: at 11:52

## 2021-08-20 RX ADMIN — REGADENOSON 0.4 MG: 0.08 INJECTION, SOLUTION INTRAVENOUS at 13:04

## 2021-08-20 RX ADMIN — ENOXAPARIN SODIUM 40 MG: 40 INJECTION SUBCUTANEOUS at 08:19

## 2021-08-20 RX ADMIN — GADOTERIDOL 20 ML: 279.3 INJECTION, SOLUTION INTRAVENOUS at 18:13

## 2021-08-20 RX ADMIN — Medication 10 ML: at 08:20

## 2021-08-20 RX ADMIN — MAGNESIUM SULFATE HEPTAHYDRATE 1000 MG: 1 INJECTION, SOLUTION INTRAVENOUS at 09:36

## 2021-08-20 RX ADMIN — AMLODIPINE BESYLATE 5 MG: 5 TABLET ORAL at 08:19

## 2021-08-20 RX ADMIN — Medication 35 MILLICURIE: at 13:06

## 2021-08-20 RX ADMIN — Medication 5 ML: at 22:20

## 2021-08-20 ASSESSMENT — PAIN SCALES - GENERAL
PAINLEVEL_OUTOF10: 0

## 2021-08-20 NOTE — CONSULTS
Astrid Stubbs [facility]  1960  Date of Service: 8/20/2021    Reason for Consultation: We were asked to see Jovon Osborn by Dr. Dietrich Rinne, MD  regarding an abnormal troponin. History of Chief Complaint: This is a 64 y.o. male not previously known to our group. They have a history of muscular dystrophy, hypertension, obesity. He uses a walker for the muscular dystrophy. Yesterday EMS was called due to altered mental status. Today he states that he has no memory of the event. He was sitting on the side of the bed. He was comfortable and in no distress. He still is confused but is able to answer questions for chest discomfort or shortness of breath appropriately. He denies any chest discomfort, dyspnea on exertion, orthopnea/PND. He denies any palpitations, or syncope, or near syncope. REVIEW OF SYSTEMS:   Heart: as above   Lungs: as above   Eyes: denies changes in vision or discharge. Ears: denies changes in hearing or pain. Nose: denies epistaxis or masses   Throat: denies sore throat or trouble swallowing. Neuro: denies numbness, tingling, tremors. Skin: denies rashes or itching. : denies hematuria, dysuria   GI: denies vomiting, diarrhea   Psych: denies mood changed, anxiety, depression.     CURRENT MEDICATIONS:  Current Facility-Administered Medications   Medication Dose Route Frequency Provider Last Rate Last Admin    regadenoson (LEXISCAN) injection 0.4 mg  0.4 mg Intravenous ONCE PRN Kt Gipsoni,         amLODIPine (NORVASC) tablet 5 mg  5 mg Oral Daily Abena Maizes Learn, APRN - CNP   5 mg at 08/20/21 0819    traMADol (ULTRAM) tablet 50 mg  50 mg Oral Q6H PRN Abena Maizes Learn, APRN - CNP        sodium chloride flush 0.9 % injection 5-40 mL  5-40 mL Intravenous 2 times per day Abena Maizes Learn, APRN - CNP   10 mL at 08/20/21 0820    sodium chloride flush 0.9 % injection 10 mL  10 mL Intravenous PRN Abena Maizes Learn, APRN - CNP        0.9 % sodium chloride infusion  25 mL Intravenous PRN Rand Poli Learn, APRN - CNP        ondansetron (ZOFRAN-ODT) disintegrating tablet 4 mg  4 mg Oral Q8H PRN Rand Poli Learn, APRN - CNP        Or    ondansetron (ZOFRAN) injection 4 mg  4 mg Intravenous Q6H PRN Rand Poli Learn, APRN - CNP        acetaminophen (TYLENOL) tablet 650 mg  650 mg Oral Q6H PRN Rand Poli Learn, APRN - CNP        Or    acetaminophen (TYLENOL) suppository 650 mg  650 mg Rectal Q6H PRN Rand Poli Learn, APRN - CNP        magnesium hydroxide (MILK OF MAGNESIA) 400 MG/5ML suspension 30 mL  30 mL Oral Daily PRN [place] Poli Learn, APRN - CNP        aspirin chewable tablet 81 mg  81 mg Oral Daily Rand Poli Learn, APRN - CNP   81 mg at 08/20/21 0818    enoxaparin (LOVENOX) injection 40 mg  40 mg Subcutaneous Daily Rand Poli Learn, APRN - CNP   40 mg at 08/20/21 2034    atorvastatin (LIPITOR) tablet 40 mg  40 mg Oral Nightly Rand Poli Learn, APRN - CNP        potassium chloride (KLOR-CON M) extended release tablet 40 mEq  40 mEq Oral PRN Rand Poli Learn, APRN - CNP        Or    potassium bicarb-citric acid (EFFER-K) effervescent tablet 40 mEq  40 mEq Oral PRN Rand Poli Learn, APRN - CNP        Or    potassium chloride 10 mEq/100 mL IVPB (Peripheral Line)  10 mEq Intravenous PRN Rand Poli Learn, APRN - CNP        nitroGLYCERIN (NITROSTAT) SL tablet 0.4 mg  0.4 mg Sublingual Q5 Min PRN Rand Poli Learn, APRN - CNP            ALLERGIES:  Allergies   Allergen Reactions    Hydrocodone      Nausea         MEDICAL HISTORY:  Past Medical History:   Diagnosis Date    Hypertension     [facility]mus[facility] [address]     Obesity (BMI 35.0-39.9 without comorbidity) 8/20/2021       SURGICAL HISTORY:  Past Surgical History:   Procedure Laterality Date    CYST REMOVAL      on tailbone when was a child       FAMILY HISTORY:  History reviewed. No pertinent family history.     SOCIAL HISTORY:  Social History     Socioeconomic History    Marital status:      Spouse name: None    Number of children: None    Years of education: None    Highest education level: None   Occupational History    None   Tobacco Use    Smoking status: Never Smoker    Smokeless tobacco: Never Used   Substance and Sexual Activity    Alcohol use: Yes     Comment: Occasionally    Drug use: No    Sexual activity: Yes     Partners: Female   Other Topics Concern    None   Social History Narrative    None     Social Determinants of Health     Financial Resource Strain:     Difficulty of Paying Living Expenses:    Food Insecurity:     Worried About Running Out of Food in the Last Year:     [address] in the Last Year:    Transportation Needs:     Lack of Transportation (Medical):  Lack of Transportation (Non-Medical):    Physical Activity:     Days of Exercise per Week:     Minutes of Exercise per Session:    Stress:     Feeling of Stress :    Social Connections:     Frequency of Communication with Friends and Family:     Frequency of Social Gatherings with Friends and Family:     Attends Sabianist Services:     Active Member of Clubs or Organizations:     Attends Club or Organization Meetings:     Marital Status:    Intimate Partner Violence:     Fear of Current or Ex-Partner:     Emotionally Abused:     Physically Abused:     Sexually Abused:        PHYSICAL EXAM:  Vitals:    08/19/21 1935 08/19/21 2156 08/19/21 2230 08/20/21 0730   BP: 133/69 130/65 133/78 (!) 156/65   Pulse: 77 79 95 82   Resp: 16 16 18 16   Temp:   98.2 °F (36.8 °C) 98.3 °F (36.8 °C)   TempSrc:   Temporal Oral   SpO2: 98% 98%  98%   Weight:    285 lb (129.3 kg)   Height:    5' 11\" (1.803 m)       GENERAL:  He is alert and oriented x 3 but he is still confused, communicates well, in no distress. NECK:  No masses, trachea is mid position. Supple, full ROM, no JVD or bruits. No palpable thyromegaly or lymphadenopathy. HEART:  Regular rate and rhythm. Normal S1 and S2.   No abnormal murmur. No heaves. LUNGS:  Clear to auscultation bilaterally. No use of accessory muscles. ABDOMEN:  Soft, non-tender. Normal bowel sounds. EXTREMITIES:  Full ROM x4. No bilateral lower extremity edema. Good distal pulses. EYES:  PERRL, normal lids & conjunctiva. No icterus. ENT: no external masses, no bleeding. Moist mucosa. Normal lips formation. NEURO: Full ROM x 4, EOMI, no tremors. SKIN:  Warm, dry and intact. Normal turgor, no petechia. Phych: alert & oriented x3. Still confused. Currently not agitated or anxious. EKG: Sinus rhythm, 64 bpm, left axis, poor R wave progression. Nonspecific ST - T wave changes. Labs:  Recent Labs     08/19/21  1255 08/20/21  0434   WBC 6.7 6.4   HGB 14.9 14.9   HCT 42.3 43.5   MCV 87.6 90.4    160     Recent Labs     08/19/21  1255 08/20/21  0434    137   K 3.2* 3.7   CL 98 100   CO2 22 28   BUN 17 17   CREATININE 0.7 0.7   MG 1.9  --      No results for input(s): PROTIME, INR in the last 72 hours. Recent Labs     08/19/21  1255 08/20/21  0434   CKTOTAL 566* 439*   CKMB 6.3  --      No results for input(s): BNP in the last 72 hours. Recent Labs     08/20/21  0434   CHOL 150   HDL 44   TRIG 139     Recent Labs     08/19/21  1255 08/19/21  2344   TROPHS 60* 66*       Assessment:   1. Altered mental status. 2. Abnormal troponin. 3. Elevated CK but normal MB most likely due to his muscular dystrophy. 4. Muscular dystrophy  5. Hypertension  6. Obese      Recommendations:  1. Replace potassium  2. Replace magnesium  3. Lexiscan Cardiolite stress test.    Thank you for allowing me to participate in your patient's care. [address] [facility] [place], [address]  Interventional Cardiology    Note: This report was completed using computerized voice recognition software. Every effort has been made to ensure accuracy, however; and invert and computerized transcription errors may be present.

## 2021-08-20 NOTE — PROGRESS NOTES
Dr. Peng Shane notified of patient's positive D-dimer per request of Dr. Saira Rosa. No new orders at this time.

## 2021-08-20 NOTE — H&P
[address] [facility]  History and Physical      CHIEF COMPLAINT:    Chief Complaint   Patient presents with    Altered Mental Status     patient DX with MD, per EMS family reported that he's been acting different, was driving car and told wife that he was going to kill them        Patient of Ludmila Mcclure MD presents with:  Altered mental status    History of Present Illness:   Patient is an awful historian. Most of his answers to questions are incongruous and illogical.  Is kind of hard to even quote him because the answers are so off-the-wall wrong. For one example, I asked if he felt any weakness or numbness anywhere, and he gave me a big enthusiastic grin and said \"I look great! \"  He seems to deny pain or discomfort. I have no idea if this is his baseline mental status, as all of his contact phone numbers are disconnected/not working. Reportedly he told his wife he was going to kill his whole family? This is per the ED triage note. He denies this to me. REVIEW OF SYSTEMS:  Pertinent negatives are above in HPI. 10 point ROS otherwise negative. Past Medical History:   Diagnosis Date    Hypertension     MD (muscular dystrophy) ([facility] [address]          Past Surgical History:   Procedure Laterality Date    CYST REMOVAL      on [facility] when was a child       Medications Prior to Admission:    Medications Prior to Admission: traMADol (ULTRAM) 50 MG tablet, Take 50 mg by mouth every 6 hours as needed for Pain. amLODIPine (NORVASC) 5 MG tablet, Take 5 mg by mouth daily. Naproxen Sodium (ALEVE) 220 MG CAPS, Take  by mouth. Note that the patient's home medications were reviewed and the above list is accurate to the best of my knowledge at the time of the exam.    Allergies:    Hydrocodone    Social History:    reports that he has never smoked. He has never used smokeless tobacco. He reports current alcohol use. He reports that he does not use drugs.     Family History:   Unable to obtain      PHYSICAL EXAM:    Vitals:  /78   Pulse 95   Temp 98.2 °F (36.8 °C) (Temporal)   Resp 18   SpO2 98%        General appearance: NAD, conversant  Eyes: Sclerae anicteric, PERRLA  HEENT: AT/NC, MMM. Poor dentition  Neck: FROM, supple, no thyromegaly  Lymph: No cervical / supraclavicular lymphadenopathy  Lungs: Clear to auscultation, WOB normal  CV: RRR, no MRGs, no lower extremity edema  Abdomen: Soft, non-tender; no masses or HSM, +BS  Extremities: FROM without synovitis. No clubbing or cyanosis of the hands. Skin: no rash, induration, lesions, or ulcers  Psych: Calm and cooperative. Limited judgement and insight. Incongrously bright/cheerful mood and affect. Neuro: Alert and interactive, face symmetric, speech fluent. Content of speech is bizarre and inappropriate. EOMI, PERRLA, handgrips 5/5 b/l, hip flexors 5/5 b/l, foot plantarflexors and dorsiflexors 5/5 b/l    LABS:  All labs reviewed.   Of note:  CBC with Differential:    Lab Results   Component Value Date    WBC 6.4 08/20/2021    RBC 4.81 08/20/2021    HGB 14.9 08/20/2021    HCT 43.5 08/20/2021     08/20/2021    MCV 90.4 08/20/2021    MCH 31.0 08/20/2021    MCHC 34.3 08/20/2021    RDW 13.2 08/20/2021    SEGSPCT 85 11/07/2012    LYMPHOPCT 28.5 08/19/2021    MONOPCT 7.7 08/19/2021    BASOPCT 0.4 08/19/2021    MONOSABS 0.52 08/19/2021    LYMPHSABS 1.92 08/19/2021    EOSABS 0.12 08/19/2021    BASOSABS 0.03 08/19/2021     CMP:    Lab Results   Component Value Date     08/20/2021    K 3.7 08/20/2021     08/20/2021    CO2 28 08/20/2021    BUN 17 08/20/2021    CREATININE 0.7 08/20/2021    GFRAA >60 08/20/2021    LABGLOM >60 08/20/2021    GLUCOSE 97 08/20/2021    GLUCOSE 91 03/10/2011    PROT 7.4 08/19/2021    LABALBU 4.1 08/19/2021    LABALBU 4.7 03/10/2011    CALCIUM 9.1 08/20/2021    BILITOT 0.6 08/19/2021    ALKPHOS 141 08/19/2021    AST 35 08/19/2021    ALT 32 08/19/2021       Imaging:  I've personally reviewed the

## 2021-08-20 NOTE — PLAN OF CARE
Problem: Falls - Risk of:  Goal: Will remain free from falls  Description: Will remain free from falls  Outcome: Met This Shift  Goal: Absence of physical injury  Description: Absence of physical injury  Outcome: Met This Shift     Problem: Mental Status - Impaired:  Goal: Mental status will improve  Description: Mental status will improve  Outcome: Ongoing

## 2021-08-20 NOTE — PROCEDURES
[address] Report    MRN: [id number]   PATIENT NAME: [facility]   DATE OF REPORT: 2021   DATE OF SERVICE: 2021    PHYSICIAN NAME: Rosalinda Walters DO  Referring Physician: Rosalinda Walters DO       Patient's : 1960   Patient's Age: 64 y.o. Gender: male     PROCEDURE: Routine EEG with video      Clinical Interpretation: This was a normal study during waking and drowsiness. No seizures or epileptiform discharges were noted during this study. ____________________________  Electronically signed by: Rosalinda Walters DO, 2021 7:35 PM      Patient Clinical Information   Reason for Study: Patient undergoing evaluation for change in personality and behavior  Patient State: Awake  Primary neurological diagnosis: Altered mental status   Primary indication for monitoring: Diagnosis of nonconvulsive seizures    Pertinent Medications and Treatments    QUEtiapine     Sedatives administered: No  Intubated: No  Pharmacological paralytic: No    Reporting Period  Start of Study: , 2021   End of Study:  , 2021       EEG Description  Digital video and scalp EEG monitoring was performed using the standard protocol for this laboratory. Scalp electrodes were applied in the international 10/20 system. Multiple digital montage arrangements were utilized for evaluation. EKG and video were recorded. Background:      Occipital rhythm (posterior dominant rhythm or PDR): Present   Frequency: 9 Hz  Voltage: Medium   Organization: fair   Reactivity to eye opening/closure: fair    Drowsiness: Present - normal  Sleep: Absent    Technical and Activation Procedures:  Hyperventilation: Not done        Photic stimulation: Done - physiologic driving noted        Reactivity to stimulation: Yes    Abnormalities:    I. Seizures? No    II. Rhythmic or Periodic Patterns? No    III. Other Abnormalities?         No

## 2021-08-20 NOTE — CARE COORDINATION
Spoke with patient's spouse at bedside while he was off the floor for stress test. Patient from home, no history of confusion. He Uses a walker all the time or an electric scooter for long distances. Spouse is retired and able to provide 24 hour supervision. PCP is Dr. Natali Ballesteros. No homecare at this time. Initial plan is home, neuro and psych on consult for confusion. For questions I can be reached at [phone].  Darlene Da Silva, [facility]

## 2021-08-20 NOTE — PROGRESS NOTES
[facility]le [facility] Nuclear Stress Test:    Cardiologist: Dr. Stacy García EKG: NSR, LAFB, NSST, abnormal EKG. Indications for study: Chest pain    1. No chest pain  2. No new arrhythmias  3. No EKG changes suggestive of stress induced ischemia  4. Nuclear images pending    Petra Munson MD., [facility] [place]    [facility]) Cardiology

## 2021-08-20 NOTE — FLOWSHEET NOTE
A&O x4 but Has fantastical?grand stories r/t admission; seems paranoid at times. - says that the police brought him because they thought he had guns; says he has  brother in law problems and \"we all know how that goes. \"; Talks with jaws clenched and almost at a whisper at times. Tells me he loves me, loves all of us for taking care of him. Oriented to room, bed, call light, staff and POC - verbalizes understanding. Agrees to call for assistance as needed and when OOB. Bed low, wheels locked. Call light and personal items in reach.

## 2021-08-20 NOTE — CONSULTS
Reason for consult: AMS, homicidal statements to family      Consulting Physician: Dr. Mino Foster      HPI:   Darvin Denton is a 64 y.o. male presenting to the ED for ams/not acting himself, beginning unk ago. The complaint has been persistent, moderate in severity, and worsened by nothing. Ams/odd behavior. Pt hx musc dystrophy. Pt poor historian, hx per ems. No fever/chills/sweats/n/v/d. Family reports statements of hurting others. Psychiatry went to assess the patient this afternoon along with 2 medical students, the patient was out for procedure having a stress test performed. The patient's wife was present in the room. She was agreeable to speak with me and the students, and shared her concerns. Patient's wife states that they were out grocery shopping going through their normal day. That she had ran into the grocery store and he remained in the car she stated that she was not in the store very long at all when she returned to the car he was acting bizarre and made statements towards her and talk to her in a manner that he has never done so in the past.  She states that they have been  for 40 years and in that 36 years he is never talked to her like this or behaved in any manner similar to how he displayed yesterday. She stated that she was very fearful of him and felt that she should have called the police. She states that when she did return to the car from the grocery store he yelled at her for taking too long in the store and called her \"dumb  bitch. \"  She states that they live 45 minutes from the shopping center they were at however it took over 3 hours for them to return home as he was not able to remember how to get home, he was driving erratically in traffic, and was not following her directions to get home. She states that he missed multiple exits and turned the wrong direction multiple times.   She stated that he was driving erratically in traffic at a high rate of speed and was very Patient is a 69y old  Male who presents with a chief complaint of Witnessed Fall (21 Jul 2020 11:39)    pt seen in icu [x  ], reg med floor [   ], bed [ x ], chair at bedside [   ], a+o x3 [  ], somnolent but arouseable [x  ],    nad [ x ]        Allergies    No Known Allergies        Vitals    T(F): 98.4 (07-22-20 @ 04:54), Max: 98.8 (07-21-20 @ 15:47)  HR: 62 (07-22-20 @ 04:54) (55 - 78)  BP: 167/71 (07-22-20 @ 04:54) (121/71 - 167/71)  RR: 20 (07-21-20 @ 23:42) (18 - 20)  SpO2: 98% (07-21-20 @ 23:42) (98% - 100%)  Wt(kg): --  CAPILLARY BLOOD GLUCOSE      POCT Blood Glucose.: 112 mg/dL (21 Jul 2020 16:30)      Labs                          14.6   5.40  )-----------( 134      ( 21 Jul 2020 07:23 )             43.8       07-21    142  |  105  |  7   ----------------------------<  96  4.1   |  28  |  0.85    Ca    8.9      21 Jul 2020 07:23  Phos  3.1     07-21  Mg     2.4     07-21    TPro  6.0  /  Alb  2.8<L>  /  TBili  0.4  /  DBili  x   /  AST  43<H>  /  ALT  12  /  AlkPhos  41  07-20                Radiology Results      Meds    MEDICATIONS  (STANDING):  aspirin  chewable 81 milliGRAM(s) Oral daily  atorvastatin 40 milliGRAM(s) Oral at bedtime  benztropine 0.5 milliGRAM(s) Oral at bedtime  carbidopa 48.75 mG/levodopa 195 mG ER 3 Capsule(s) Oral <User Schedule>  clopidogrel Tablet 75 milliGRAM(s) Oral daily  clotrimazole 1% Cream 1 Application(s) Topical two times a day  enoxaparin Injectable 70 milliGRAM(s) SubCutaneous two times a day  losartan 25 milliGRAM(s) Oral daily  melatonin 5 milliGRAM(s) Oral at bedtime  pantoprazole    Tablet 40 milliGRAM(s) Oral before breakfast  polyethylene glycol 3350 17 Gram(s) Oral daily  senna 2 Tablet(s) Oral at bedtime      MEDICATIONS  (PRN):  acetaminophen   Tablet .. 650 milliGRAM(s) Oral every 6 hours PRN Temp greater or equal to 38C (100.4F), Mild Pain (1 - 3), Moderate Pain (4 - 6)      Physical Exam    Neuro :  no focal deficits  Respiratory: CTA B/L  CV: RRR, S1S2, no murmurs,   Abdominal: Soft, NT, ND +BS,  Extremities: No edema, + peripheral pulses    ASSESSMENT    right Carotid dissection SHIREEN with stroke  h/o Parkinson disease  Other hyperlipidemia  Essential hypertension        PLAN      No IV tpa given because beyond time window and non-disabling deficits  cont ASA and PLavix   Statin  neuro f/u   continue anticoagulation.   MRI head and MRA head without contrast and MRA Neck with contrast with MRI BRAIN: No acute intracranial findings. Very mild chronic microvascular ischemic disease.  MRA NECK: Please note, study is significantly degraded due to patient motion artifact. Limited evaluation of the focal dissection at the distal right cervical ICA, please see CTA neck 7/16/2020 for further assessment.   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 No evidence of vascular stenosis, occlusion, or vascular malformation noted above.   vascular cons noted   no vascular surgery intervention at this time  cont losartan  GI ppx with protonix  COVID-19 negative x1   cont current meds angry.  She stated that he was rambling and tangential.  She stated that he was speaking about his father that  in , his mood was very labile and she stated he she was fearful because he was so unpredictable. She again stresses that she has never experienced any kind of behaviors like this before from him and that she was terrified. She tells me that he was started on Prozac 20 mg unknown time ago for reports of \"anxiety. \"  She has concerns that this medication may be contributing to the change in personality. However due to the abruptness and change in behavior and orientation it is unlikely this is caused by the Prozac. However as a safety and out of caution for the patient we will recommend not continuing the Prozac at this time. Psychiatry is highly concerned for underlying medical condition due to the abrupt change in mentation, and personality. DX      Plan/Recommendations: The patient case, plan of care and recommendations has been discussed with Dr Gurjit Chamorro and the collaborative psychiatric care team.   Due to patient's recent admission to change in behavior and personality, psychiatry does not recommend continuing the patient's Prozac. Psychiatry is not able to assess the patient this afternoon as he was out for a stress test.  Psychiatry will follow up with the patient for assessment of the patient and further recommendations. Patient is a 69y old  Male who presents with a chief complaint of Witnessed Fall (21 Jul 2020 11:39)    pt seen in tele [x  ], reg med floor [   ], bed [ x ], chair at bedside [   ], a+o x3 [ x ], somnolent but arouseable [  ],    nad [ x ]        Allergies    No Known Allergies        Vitals    T(F): 98.4 (07-22-20 @ 04:54), Max: 98.8 (07-21-20 @ 15:47)  HR: 62 (07-22-20 @ 04:54) (55 - 78)  BP: 167/71 (07-22-20 @ 04:54) (121/71 - 167/71)  RR: 20 (07-21-20 @ 23:42) (18 - 20)  SpO2: 98% (07-21-20 @ 23:42) (98% - 100%)  Wt(kg): --  CAPILLARY BLOOD GLUCOSE      POCT Blood Glucose.: 112 mg/dL (21 Jul 2020 16:30)      Labs                          14.6   5.40  )-----------( 134      ( 21 Jul 2020 07:23 )             43.8       07-21    142  |  105  |  7   ----------------------------<  96  4.1   |  28  |  0.85    Ca    8.9      21 Jul 2020 07:23  Phos  3.1     07-21  Mg     2.4     07-21    TPro  6.0  /  Alb  2.8<L>  /  TBili  0.4  /  DBili  x   /  AST  43<H>  /  ALT  12  /  AlkPhos  41  07-20                Radiology Results      Meds    MEDICATIONS  (STANDING):  aspirin  chewable 81 milliGRAM(s) Oral daily  atorvastatin 40 milliGRAM(s) Oral at bedtime  benztropine 0.5 milliGRAM(s) Oral at bedtime  carbidopa 48.75 mG/levodopa 195 mG ER 3 Capsule(s) Oral <User Schedule>  clopidogrel Tablet 75 milliGRAM(s) Oral daily  clotrimazole 1% Cream 1 Application(s) Topical two times a day  enoxaparin Injectable 70 milliGRAM(s) SubCutaneous two times a day  losartan 25 milliGRAM(s) Oral daily  melatonin 5 milliGRAM(s) Oral at bedtime  pantoprazole    Tablet 40 milliGRAM(s) Oral before breakfast  polyethylene glycol 3350 17 Gram(s) Oral daily  senna 2 Tablet(s) Oral at bedtime      MEDICATIONS  (PRN):  acetaminophen   Tablet .. 650 milliGRAM(s) Oral every 6 hours PRN Temp greater or equal to 38C (100.4F), Mild Pain (1 - 3), Moderate Pain (4 - 6)      Physical Exam    Neuro :  no focal deficits  Respiratory: CTA B/L  CV: RRR, S1S2, no murmurs,   Abdominal: Soft, NT, ND +BS,  Extremities: No edema, + peripheral pulses    ASSESSMENT    right Carotid dissection SHIREEN with stroke  h/o Parkinson disease  Other hyperlipidemia  Essential hypertension        PLAN      neuro f/u noted  Aspirin 81mg daily & Clopidogrel 75mg daily for 6 months to prevent embolic strokes during recovery of dissection  Additional anticoagulation with enoxaparin is not needed as per most recent guidelines  Continue atorvastatin 40mg QHS  Follow up with Neurology / Stroke, which can then arrange referral to Neurosurgery as needed  MRI head and MRA head without contrast and MRA Neck with contrast with MRI BRAIN: No acute intracranial findings. Very mild chronic microvascular ischemic disease.  MRA NECK: Please note, study is significantly degraded due to patient motion artifact. Limited evaluation of the focal dissection at the distal right cervical ICA, please see CTA neck 7/16/2020 for further assessment.   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 No evidence of vascular stenosis, occlusion, or vascular malformation noted above.   vascular cons noted   no vascular surgery intervention at this time  cont losartan  GI ppx with protonix  COVID-19 negative x1   cont current meds   pt stable for d/c   neuro f/u outpt

## 2021-08-20 NOTE — CONSULTS
[facility]  Neurology Consult    Date:  8/20/2021  Patient Name:  Toribio Abreu  YOB: 1960  MRN: [id number]     PCP:  Jovita Bajwa MD   Referring:  No ref. provider found      Chief Complaint: change in behavior    History obtained from: patient, spouse    Meghan Pollock is a 64 y.o. male admitted with acute to subacute changes in personality with loss of memory regarding most recent episode. Autoimmune/inflammatory and potential infectious etiologies would be highest on my differential in particular something such as autoimmune encephalitis. EEG normal at this time making seizures unlikely. Stroke is also a possibility, particularly if considering something such as vasculitis, though this is less favored. Plan  · MRI brain w/wo contrast  · If MRI without explanation plan for LP and CSF testing including autoimmune encephalitis panel  · Labs sent: TSH, TPO antibodies, ESR, CRP, B12, Folate, GOMEZ, autoimmune encephalitis panel (serum)        History of Present Illness:  Toribio Abreu is a 64 y.o. left handed male presenting for evaluation of changes in behavior. The patient's wife provides the majority of the history. She states for approximately the last month or so she has noted that the patient has become progressively more focused on death, specifically, killing of people. She has also noted that he has been seeing people that aren't there. His mood has been fluctuating, and though the patient appears very pleasant at this time his wife states that he had threatened her previously. The patient states he would never do anything to hurt his family at this time. The patient does not recall this event which prompted admission to the hospital wherein he began yelling at her and acting strangely while out at the store. He then began driving erratically and they were pulled over by the police.  This prompted her to call EMS and have him brought to the [facility]. There are concerns for a non-psychological reason for his abrupt change in behavior. 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 There are members of the family which have muscular dystrophy apparently, but it is uncertain if he also does. Review of Systems:  Relates history of night sweats, no weight loss    Medical History:   Past Medical History:   Diagnosis Date    Hypertension     [facility]     Obesity (BMI 35.0-39.9 without comorbidity) 8/20/2021        Surgical History:   Past Surgical History:   Procedure Laterality Date    CYST REMOVAL      on tailbone when was a child        Family History:   History reviewed. No pertinent family history.     Social History:  Social History     Tobacco Use    Smoking status: Never Smoker    Smokeless tobacco: Never Used   Substance Use Topics    Alcohol use: Yes     Comment: Occasionally    Drug use: No        Current Medications:      Current Facility-Administered Medications   Medication Dose Route Frequency Provider Last Rate Last Admin    QUEtiapine (SEROQUEL) tablet 25 mg  25 mg Oral BID PRN Rylie Jiang MD        amLODIPine (NORVASC) tablet 5 mg  5 mg Oral Daily Nancy Larson, APRN - CNP   5 mg at 08/20/21 0819    traMADol (ULTRAM) tablet 50 mg  50 mg Oral Q6H PRN Nancy Larson, APRN - CNP        sodium chloride flush 0.9 % injection 5-40 mL  5-40 mL Intravenous 2 times per day [facility], APRN - CNP   10 mL at 08/20/21 0820    sodium chloride flush 0.9 % injection 10 mL  10 mL Intravenous PRN Nancy Larson, APRN - CNP        0.9 % sodium chloride infusion  25 mL Intravenous PRN Nancy Larson, APRN - CNP        ondansetron (ZOFRAN-ODT) disintegrating tablet 4 mg  4 mg Oral Q8H PRN Nancy Larson, APRN - CNP        Or    ondansetron (ZOFRAN) injection 4 mg  4 mg Intravenous Q6H PRN Nancy Larson, APRN - CNP       Celi Ledezma acetaminophen (TYLENOL) tablet 650 mg  650 mg Oral Q6H PRN [facility] Learn, APRN - CNP        Or    acetaminophen (TYLENOL) suppository 650 mg  650 mg Rectal Q6H PRN [facility] Learn, APRN - CNP        magnesium hydroxide (MILK OF MAGNESIA) 400 MG/5ML suspension 30 mL  30 mL Oral Daily PRN Ynes [facility]aza Learn, APRN - CNP        aspirin chewable tablet 81 mg  81 mg Oral Daily [facility], APRN - CNP   81 mg at 08/20/21 0818    enoxaparin (LOVENOX) injection 40 mg  40 mg Subcutaneous Daily [facility] Learn, APRN - CNP   40 mg at 08/20/21 0819    atorvastatin (LIPITOR) tablet 40 mg  40 mg Oral Nightly [facility], APRN - CNP        potassium chloride (KLOR-CON M) extended release tablet 40 mEq  40 mEq Oral PRN [facility] Learn, APRN - CNP        Or    potassium bicarb-citric acid (EFFER-K) effervescent tablet 40 mEq  40 mEq Oral PRN [facility] Learn, APRN - CNP        Or    potassium chloride 10 mEq/100 mL IVPB (Peripheral Line)  10 mEq Intravenous PRN [facility] Learn, APRN - CNP        nitroGLYCERIN (NITROSTAT) SL tablet 0.4 mg  0.4 mg Sublingual Q5 Min PRN [facility] Neo, APRN - CNP            Allergies: Allergies   Allergen Reactions    Hydrocodone      Nausea          Physical Examination  Vitals   Vitals:    08/19/21 2156 08/19/21 2230 08/20/21 0730 08/20/21 1430   BP: 130/65 133/78 (!) 156/65 (!) 144/105   Pulse: 79 95 82 80   Resp: 16 18 16 16   Temp:  98.2 °F (36.8 °C) 98.3 °F (36.8 °C) 98.3 °F (36.8 °C)   TempSrc:  Temporal Oral Oral   SpO2: 98%  98% 100%   Weight:   285 lb (129.3 kg)    Height:   5' 11\" (1.803 m)         General: Patient appears in no acute distress  HEENT: Normocephalic, atraumatic  Chest: no respiratory distress noted  Extremities: No edema or cyanosis noted    Neurologic Examination    Mental Status  Alert, and oriented to person, place and time. Speech is fluent, though slightly pressured at times. Attention is mildly impaired. Follows commands easily. No apparent aphasia. Cranial Nerves  II. Visual fields full to confrontation bilaterally. III, IV, VI: Pupils equally round and reactive to light, 4 to 3 mm bilaterally. EOMs: full, no nystagmus. V. Facial sensation intact to light touch bilaterally  VII: Facial movements symmetric and strong  VIII: Hearing intact to voice  IX,X: Palate elevates symmetrically. No dysarthria  XI: Sternocleidomastoid and trapezius 5/5 bilaterally   XII: Tongue is midline    Motor     Right Left   Right Left   Deltoid 5 5  Hip Flexion 5 5   Biceps      5  5  Knee Extension 4 4   Triceps 5 5  Knee Flexion 4+ 4+   Handgrip 5 5  Ankle Dorsiflexion 0 0       Ankle Plantarflexion 4 4     Bulk: Distal atrophy noted in BL LEs    Sensation  · Light Touch: Intact distally in all four limbs  ·     Reflexes     Right Left   Biceps 2 2   Brachioradialis 2 2   Triceps 2 2   Patellar 1 1   Achilles 0 0   ankle clonus none none     Toes down going bilaterally. Coordination  Rapid alternating movements: slowed in BL UEs  Finger to nose testing normal bilaterally  Heel to shin testing normal bilaterally    Gait  Normal base, stride, and arm swing with casual gait. 2-3 steps to turn.        Labs  Pending    Imaging  Pending        Electronically signed by: Ellery Schwab, DO, 8/20/2021 3:50 PM

## 2021-08-21 LAB
ALBUMIN SERPL-MCNC: 3.8 G/DL (ref 3.5–5.2)
ALP BLD-CCNC: 132 U/L (ref 40–129)
ALT SERPL-CCNC: 34 U/L (ref 0–40)
ANION GAP SERPL CALCULATED.3IONS-SCNC: 11 MMOL/L (ref 7–16)
AST SERPL-CCNC: 33 U/L (ref 0–39)
BASOPHILS ABSOLUTE: 0.03 E9/L (ref 0–0.2)
BASOPHILS RELATIVE PERCENT: 0.4 % (ref 0–2)
BILIRUB SERPL-MCNC: 0.5 MG/DL (ref 0–1.2)
BUN BLDV-MCNC: 19 MG/DL (ref 6–23)
CALCIUM SERPL-MCNC: 9 MG/DL (ref 8.6–10.2)
CHLORIDE BLD-SCNC: 99 MMOL/L (ref 98–107)
CO2: 26 MMOL/L (ref 22–29)
CREAT SERPL-MCNC: 0.6 MG/DL (ref 0.7–1.2)
EOSINOPHILS ABSOLUTE: 0.09 E9/L (ref 0.05–0.5)
EOSINOPHILS RELATIVE PERCENT: 1.2 % (ref 0–6)
GFR AFRICAN AMERICAN: >60
GFR NON-AFRICAN AMERICAN: >60 ML/MIN/1.73
GLUCOSE BLD-MCNC: 105 MG/DL (ref 74–99)
HCT VFR BLD CALC: 43.7 % (ref 37–54)
HEMOGLOBIN: 15.2 G/DL (ref 12.5–16.5)
IMMATURE GRANULOCYTES #: 0.04 E9/L
IMMATURE GRANULOCYTES %: 0.6 % (ref 0–5)
LYMPHOCYTES ABSOLUTE: 1.26 E9/L (ref 1.5–4)
LYMPHOCYTES RELATIVE PERCENT: 17.5 % (ref 20–42)
MCH RBC QN AUTO: 31 PG (ref 26–35)
MCHC RBC AUTO-ENTMCNC: 34.8 % (ref 32–34.5)
MCV RBC AUTO: 89.2 FL (ref 80–99.9)
MONOCYTES ABSOLUTE: 0.45 E9/L (ref 0.1–0.95)
MONOCYTES RELATIVE PERCENT: 6.2 % (ref 2–12)
NEUTROPHILS ABSOLUTE: 5.34 E9/L (ref 1.8–7.3)
NEUTROPHILS RELATIVE PERCENT: 74.1 % (ref 43–80)
PDW BLD-RTO: 13.3 FL (ref 11.5–15)
PLATELET # BLD: 189 E9/L (ref 130–450)
PMV BLD AUTO: 10.4 FL (ref 7–12)
POTASSIUM SERPL-SCNC: 3.4 MMOL/L (ref 3.5–5)
RBC # BLD: 4.9 E12/L (ref 3.8–5.8)
SODIUM BLD-SCNC: 136 MMOL/L (ref 132–146)
TOTAL PROTEIN: 7.1 G/DL (ref 6.4–8.3)
WBC # BLD: 7.2 E9/L (ref 4.5–11.5)

## 2021-08-21 PROCEDURE — 6360000002 HC RX W HCPCS: Performed by: FAMILY MEDICINE

## 2021-08-21 PROCEDURE — 88108 CYTOPATH CONCENTRATE TECH: CPT

## 2021-08-21 PROCEDURE — 85025 COMPLETE CBC W/AUTO DIFF WBC: CPT

## 2021-08-21 PROCEDURE — 2580000003 HC RX 258: Performed by: FAMILY MEDICINE

## 2021-08-21 PROCEDURE — 36415 COLL VENOUS BLD VENIPUNCTURE: CPT

## 2021-08-21 PROCEDURE — 6370000000 HC RX 637 (ALT 250 FOR IP): Performed by: FAMILY MEDICINE

## 2021-08-21 PROCEDURE — 99233 SBSQ HOSP IP/OBS HIGH 50: CPT | Performed by: CLINICAL NURSE SPECIALIST

## 2021-08-21 PROCEDURE — 89051 BODY FLUID CELL COUNT: CPT

## 2021-08-21 PROCEDURE — 86593 SYPHILIS TEST NON-TREP QUANT: CPT

## 2021-08-21 PROCEDURE — 87327 CRYPTOCOCCUS NEOFORM AG IA: CPT

## 2021-08-21 PROCEDURE — 2580000003 HC RX 258: Performed by: PSYCHIATRY & NEUROLOGY

## 2021-08-21 PROCEDURE — 80053 COMPREHEN METABOLIC PANEL: CPT

## 2021-08-21 PROCEDURE — 86592 SYPHILIS TEST NON-TREP QUAL: CPT

## 2021-08-21 PROCEDURE — 2060000000 HC ICU INTERMEDIATE R&B

## 2021-08-21 PROCEDURE — 87529 HSV DNA AMP PROBE: CPT

## 2021-08-21 RX ORDER — SODIUM CHLORIDE 9 MG/ML
25 INJECTION, SOLUTION INTRAVENOUS PRN
Status: DISCONTINUED | OUTPATIENT
Start: 2021-08-21 | End: 2021-08-29 | Stop reason: HOSPADM

## 2021-08-21 RX ORDER — SODIUM CHLORIDE 0.9 % (FLUSH) 0.9 %
5-40 SYRINGE (ML) INJECTION EVERY 12 HOURS SCHEDULED
Status: DISCONTINUED | OUTPATIENT
Start: 2021-08-21 | End: 2021-08-29 | Stop reason: HOSPADM

## 2021-08-21 RX ORDER — SODIUM CHLORIDE 0.9 % (FLUSH) 0.9 %
5-40 SYRINGE (ML) INJECTION PRN
Status: DISCONTINUED | OUTPATIENT
Start: 2021-08-21 | End: 2021-08-29 | Stop reason: HOSPADM

## 2021-08-21 RX ADMIN — Medication 5 ML: at 22:46

## 2021-08-21 RX ADMIN — ASPIRIN 81 MG CHEWABLE TABLET 81 MG: 81 TABLET CHEWABLE at 09:06

## 2021-08-21 RX ADMIN — ENOXAPARIN SODIUM 40 MG: 40 INJECTION SUBCUTANEOUS at 09:07

## 2021-08-21 RX ADMIN — POTASSIUM BICARBONATE 40 MEQ: 782 TABLET, EFFERVESCENT ORAL at 09:05

## 2021-08-21 RX ADMIN — ATORVASTATIN CALCIUM 40 MG: 40 TABLET, FILM COATED ORAL at 21:00

## 2021-08-21 RX ADMIN — Medication 5 ML: at 22:45

## 2021-08-21 RX ADMIN — AMLODIPINE BESYLATE 5 MG: 5 TABLET ORAL at 09:06

## 2021-08-21 RX ADMIN — Medication 10 ML: at 09:07

## 2021-08-21 ASSESSMENT — PAIN SCALES - GENERAL
PAINLEVEL_OUTOF10: 0
PAINLEVEL_OUTOF10: 0

## 2021-08-21 NOTE — PLAN OF CARE
Lexiscan MPI showed normal perfusion and normal LVEF. · No further work-up from cardiac standpoint  · Aspirin 81 mg daily if deemed safe  · Moderate to high intensity statin and beta-blocker as tolerated by blood pressure  · Follow-up after discharge with Dr. Tyron Adams. Signing off.   Please call with any questions or concerns    Chrissie Galloway MD, [address] [facility]  Interventional Cardiology/Structural Heart Disease  Office: [phone]  Coordinator: Zahra Caicedo

## 2021-08-21 NOTE — PROGRESS NOTES
Altaf Silverman is a 64 y.o. left handed male     Patient was admitted with increased confusion he is currently a poor historian his wife is at bedside who is an excellent historian. Over the past month he has become more focused on killing people and dying himself. His mood has been fluctuating at times becoming very tearful which she did at the bedside today for me. His wife does report a family history of Alzheimer's dementia at young age and his father however states that prior to this past month he had no memory or cognitive difficulties whatsoever. 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 Previously followed with  who recommended muscle biopsy given his elevated CKs and EMG findings in 2015. He has never undergone any genetic testing and never followed through with muscle biopsy testing    There is no family history of CMT  There is a family history of multiple sclerosis    He does have a lot of property with some high grass and he will go for walks at times. No recent tick bite known    Prior to Visit Medications    Medication Sig Taking? Authorizing Provider   traMADol (ULTRAM) 50 MG tablet Take 50 mg by mouth every 6 hours as needed for Pain. Yes Historical Provider, MD   amLODIPine (NORVASC) 5 MG tablet Take 5 mg by mouth daily. Yes Historical Provider, MD   Naproxen Sodium (ALEVE) 220 MG CAPS Take  by mouth.    Yes Historical Provider, MD       Allergies as of 08/19/2021 - Fully Reviewed 08/19/2021   Allergen Reaction Noted    Hydrocodone  03/12/2014       Objective:     BP (!) 136/92   Pulse 89   Temp 98.8 °F (37.1 °C) (Oral)   Resp 16   Ht 5' 11\" (1.803 m)   Wt 285 lb (129.3 kg)   SpO2 98%   BMI 39.75 kg/m²      General appearance: Awake, cooperative appears stated age  Head: Normocephalic, without obvious abnormality, atraumatic  Extremities: no cyanosis or edema  Pulses: 2+ and symmetric  Skin: no rashes or lesions    Mental Status: Awake, oriented to person    Very tearful when talking about his condition  Tearful when finding out he cannot go home tomorrow    Speech: clear  Language: appropriate but quite verbose    Cranial Nerves:  I: smell    II: visual acuity     II: visual fields Full   II: pupils JOSE   III,VII: ptosis None   III,IV,VI: extraocular muscles  EOMI without nystagmus    V: mastication Normal   V: facial light touch sensation  Normal   V,VII: corneal reflex  Present   VII: facial muscle function - upper     VII: facial muscle function - lower Normal   VIII: hearing Normal   IX: soft palate elevation  Normal   IX,X: gag reflex    XI: trapezius strength  5/5   XI: sternocleidomastoid strength 5/5   XI: neck extension strength  5/5   XII: tongue strength  Normal     Motor:  5/5 throughout  Normal bulk and tone    Sensory:  Appreciates tuning fork in all extremities    Coordination:   FN, FFM and JEAN without ataxia     DTR:   No reflexes    No Babinski  No Mirza's     Bilateral palmar grasp  No suck reflex appreciated    Laboratory/Radiology:     CBC with Differential:    Lab Results   Component Value Date    WBC 7.2 08/21/2021    RBC 4.90 08/21/2021    HGB 15.2 08/21/2021    HCT 43.7 08/21/2021     08/21/2021    MCV 89.2 08/21/2021    MCH 31.0 08/21/2021    MCHC 34.8 08/21/2021    RDW 13.3 08/21/2021    SEGSPCT 85 11/07/2012    LYMPHOPCT 17.5 08/21/2021    MONOPCT 6.2 08/21/2021    BASOPCT 0.4 08/21/2021    MONOSABS 0.45 08/21/2021    LYMPHSABS 1.26 08/21/2021    EOSABS 0.09 08/21/2021    BASOSABS 0.03 08/21/2021     CMP:    Lab Results   Component Value Date     08/21/2021    K 3.4 08/21/2021    K 3.7 08/20/2021    CL 99 08/21/2021    CO2 26 08/21/2021    BUN 19 08/21/2021    CREATININE 0.6 08/21/2021    GFRAA >60 08/21/2021    LABGLOM >60 08/21/2021    GLUCOSE 105 08/21/2021    GLUCOSE 91 03/10/2011    PROT 7.1 08/21/2021    LABALBU 3.8 08/21/2021    LABALBU 4.7 03/10/2011    CALCIUM 9.0 08/21/2021 BILITOT 0.5 08/21/2021    ALKPHOS 132 08/21/2021    AST 33 08/21/2021    ALT 34 08/21/2021     HgBA1c:    Lab Results   Component Value Date    LABA1C 6.1 08/20/2021     FLP:    Lab Results   Component Value Date    TRIG 139 08/20/2021    HDL 44 08/20/2021    LDLCALC 78 08/20/2021    LABVLDL 28 08/20/2021       MRI Brain:  1. No acute intracranial abnormality. 2. No mass, mass effect, edema or hemorrhage. EEG  Normal     I personally reviewed the patient's lab and imaging studies at this time. Assessment:     Patient with acute to subacute changes in cognition and personality -thus far imaging and EEG have been unrevealing therefore cannot entirely exclude an autoimmune/inflammatory etiology such as autoimmune encephalitis.    These findings were conveyed with his wife and plan for lumbar puncture testing tomorrow was also conveyed      Plan:     Lyme    Plan for LP tomorrow  Lovenox held    OSEAS Hernandez - CNS  1:54 PM  8/21/2021

## 2021-08-22 LAB
ANION GAP SERPL CALCULATED.3IONS-SCNC: 12 MMOL/L (ref 7–16)
APPEARANCE CSF: CLEAR
BUN BLDV-MCNC: 23 MG/DL (ref 6–23)
CALCIUM SERPL-MCNC: 9.5 MG/DL (ref 8.6–10.2)
CHLORIDE BLD-SCNC: 100 MMOL/L (ref 98–107)
CO2: 26 MMOL/L (ref 22–29)
COLOR CSF: COLORLESS
CREAT SERPL-MCNC: 0.7 MG/DL (ref 0.7–1.2)
CRYPTOCOCCUS NEOFORMANS/GATTII CSF BY PCR: NOT DETECTED
CYTOMEGALOVIRUS CSF BY PCR: NOT DETECTED
ENTEROVIRUS CSF BY PCR: NOT DETECTED
ESCHERICHIA COLI K1 CSF BY PCR: NOT DETECTED
GFR AFRICAN AMERICAN: >60
GFR NON-AFRICAN AMERICAN: >60 ML/MIN/1.73
GLUCOSE BLD-MCNC: 101 MG/DL (ref 74–99)
GLUCOSE, CSF: 75 MG/DL (ref 40–70)
GRAM STAIN ORDERABLE: NORMAL
HAEMOPHILUS INFLUENZAE CSF BY PCR: NOT DETECTED
HERPES SIMPLEX VIRUS 1 CSF BY PCR: NOT DETECTED
HERPES SIMPLEX VIRUS 2 CSF BY PCR: NOT DETECTED
HUMAN HERPESVIRUS 6 CSF BY PCR: NOT DETECTED
HUMAN PARECHOVIRUS CSF BY PCR: NOT DETECTED
INR BLD: 1.1
LISTERIA MONOCYTOGENES CSF BY PCR: NOT DETECTED
MONOCYTE, CSF: 50 % (ref 10–70)
NEISSERIA MENINGITIDIS CSF BY PCR: NOT DETECTED
NEUTROPHILS, CSF: 50 % (ref 0–10)
PLATELET # BLD: 197 E9/L (ref 130–450)
POTASSIUM SERPL-SCNC: 3.7 MMOL/L (ref 3.5–5)
PROTEIN CSF: 22 MG/DL (ref 15–40)
PROTHROMBIN TIME: 11.6 SEC (ref 9.3–12.4)
RBC CSF: <2000 /UL
REASON FOR REJECTION: NORMAL
REJECTED TEST: NORMAL
SODIUM BLD-SCNC: 138 MMOL/L (ref 132–146)
STREPTOCOCCUS AGALACTIAE CSF BY PCR: NOT DETECTED
STREPTOCOCCUS PNEUMONIAE CSF BY PCR: NOT DETECTED
TUBE NUMBER CSF: ABNORMAL
VARICELLA ZOSTER VIRUS CSF BY PCR: NOT DETECTED
WBC CSF: <3 /UL (ref 0–2)

## 2021-08-22 PROCEDURE — 2060000000 HC ICU INTERMEDIATE R&B

## 2021-08-22 PROCEDURE — 82945 GLUCOSE OTHER FLUID: CPT

## 2021-08-22 PROCEDURE — 009U3ZX DRAINAGE OF SPINAL CANAL, PERCUTANEOUS APPROACH, DIAGNOSTIC: ICD-10-PCS | Performed by: PSYCHIATRY & NEUROLOGY

## 2021-08-22 PROCEDURE — 87483 CNS DNA AMP PROBE TYPE 12-25: CPT

## 2021-08-22 PROCEDURE — 84157 ASSAY OF PROTEIN OTHER: CPT

## 2021-08-22 PROCEDURE — 6370000000 HC RX 637 (ALT 250 FOR IP): Performed by: PSYCHIATRY & NEUROLOGY

## 2021-08-22 PROCEDURE — 85049 AUTOMATED PLATELET COUNT: CPT

## 2021-08-22 PROCEDURE — 82042 OTHER SOURCE ALBUMIN QUAN EA: CPT

## 2021-08-22 PROCEDURE — 82040 ASSAY OF SERUM ALBUMIN: CPT

## 2021-08-22 PROCEDURE — 80048 BASIC METABOLIC PNL TOTAL CA: CPT

## 2021-08-22 PROCEDURE — 85610 PROTHROMBIN TIME: CPT

## 2021-08-22 PROCEDURE — 82784 ASSAY IGA/IGD/IGG/IGM EACH: CPT

## 2021-08-22 PROCEDURE — 87205 SMEAR GRAM STAIN: CPT

## 2021-08-22 PROCEDURE — 87070 CULTURE OTHR SPECIMN AEROBIC: CPT

## 2021-08-22 PROCEDURE — 6360000002 HC RX W HCPCS: Performed by: PSYCHIATRY & NEUROLOGY

## 2021-08-22 PROCEDURE — 2580000003 HC RX 258: Performed by: PSYCHIATRY & NEUROLOGY

## 2021-08-22 PROCEDURE — 6370000000 HC RX 637 (ALT 250 FOR IP): Performed by: INTERNAL MEDICINE

## 2021-08-22 PROCEDURE — 62270 DX LMBR SPI PNXR: CPT | Performed by: PSYCHIATRY & NEUROLOGY

## 2021-08-22 PROCEDURE — 83916 OLIGOCLONAL BANDS: CPT

## 2021-08-22 PROCEDURE — 6370000000 HC RX 637 (ALT 250 FOR IP): Performed by: FAMILY MEDICINE

## 2021-08-22 PROCEDURE — 36415 COLL VENOUS BLD VENIPUNCTURE: CPT

## 2021-08-22 RX ORDER — LANOLIN ALCOHOL/MO/W.PET/CERES
1000 CREAM (GRAM) TOPICAL DAILY
Status: DISCONTINUED | OUTPATIENT
Start: 2021-08-22 | End: 2021-08-29 | Stop reason: HOSPADM

## 2021-08-22 RX ADMIN — ATORVASTATIN CALCIUM 40 MG: 40 TABLET, FILM COATED ORAL at 20:30

## 2021-08-22 RX ADMIN — Medication 1000 MCG: at 15:15

## 2021-08-22 RX ADMIN — AMLODIPINE BESYLATE 5 MG: 5 TABLET ORAL at 08:12

## 2021-08-22 RX ADMIN — METHYLPREDNISOLONE SODIUM SUCCINATE 1000 MG: 1 INJECTION, POWDER, LYOPHILIZED, FOR SOLUTION INTRAMUSCULAR; INTRAVENOUS at 18:04

## 2021-08-22 RX ADMIN — QUETIAPINE FUMARATE 25 MG: 25 TABLET ORAL at 20:30

## 2021-08-22 RX ADMIN — Medication 5 ML: at 20:30

## 2021-08-22 RX ADMIN — Medication 10 ML: at 08:12

## 2021-08-22 ASSESSMENT — PAIN SCALES - GENERAL
PAINLEVEL_OUTOF10: 0

## 2021-08-22 NOTE — PROGRESS NOTES
Subjective:    More active today. Denies any issues overnight. Regaining his sense of humor. Still somewhat confused. Objective:    BP (!) 157/86   Pulse 76   Temp 98.3 °F (36.8 °C) (Oral)   Resp 16   Ht 5' 11\" (1.803 m)   Wt 285 lb (129.3 kg)   SpO2 99%   BMI 39.75 kg/m²     In: 120 [P.O.:120]  Out: 325   In: 120   Out: 325 [Urine:325]    General appearance: NAD, conversant  HEENT: AT/NC, MMM  Neck: FROM, supple  Lungs: Clear to auscultation  CV: RRR, no MRGs  Vasc: Radial pulses 2+  Abdomen: Soft, non-tender; no masses or HSM  Extremities: No peripheral edema or digital cyanosis  Skin: no rash, lesions or ulcers  Psych: Alert and oriented to person, place and time. Inappropriate responses, disinhibited with occasional random profanity. Neuro: Alert and interactive     Recent Labs     08/19/21  1255 08/19/21  1255 08/20/21  0434 08/21/21  0442 08/22/21  0503   WBC 6.7  --  6.4 7.2  --    HGB 14.9  --  14.9 15.2  --    HCT 42.3  --  43.5 43.7  --       < > 160 189 197    < > = values in this interval not displayed. Recent Labs     08/20/21  0434 08/21/21  0442 08/22/21  0503    136 138   K 3.7 3.4* 3.7    99 100   CO2 28 26 26   BUN 17 19 23   CREATININE 0.7 0.6* 0.7   CALCIUM 9.1 9.0 9.5       Assessment:    Principal Problem:    Altered mental status  Active Problems:    Elevated troponin    MD (muscular dystrophy) ([facility])    Hypertension    Severe obesity (BMI 35.0-35.9 with comorbidity) ([facility])  Resolved Problems:    * No resolved hospital problems. *      Plan:    EEG with no evidence of seizures. Neurology following: LP planned.     Cardiology following for elevated troponin: Lexiscan Cardiolite stress test negative    Psychiatry consulted      DVT Prophylaxis   PT/OT  Discharge planning           Lesley Mercedes DO  10:06 AM  8/22/2021

## 2021-08-22 NOTE — PROCEDURES
Lumbar Puncture Procedure Note    Pre-operative Diagnosis: Acute behavioral change    Post-operative Diagnosis: same    Indications: Diagnostic    Procedure Details     Consent: Informed consent was obtained. Risks of the procedure were discussed including: infection, bleeding, pain, weakness, and headache. Under sterile conditions the patient was positioned. Betadine solution and sterile drapes were utilized. A spinal needle was inserted at the L3 - L4 interspace. Spinal fluid was obtained and sent to the laboratory. Findings  4cc of clear spinal fluid was obtained  Opening Pressure: 18cm H2O pressure    Complications:  None; patient tolerated the procedure well.           Condition: stable      Yolanda Salomon DO

## 2021-08-22 NOTE — PLAN OF CARE
Problem: Falls - Risk of:  Goal: Will remain free from falls  Description: Will remain free from falls  Outcome: Met This Shift  Goal: Absence of physical injury  Description: Absence of physical injury  Outcome: Met This Shift     Problem: Mental Status - Impaired:  Goal: Mental status will improve  Description: Mental status will improve  Outcome: Not Met This Shift     Problem: Skin Integrity:  Goal: Will show no infection signs and symptoms  Description: Will show no infection signs and symptoms  Outcome: Met This Shift  Goal: Absence of new skin breakdown  Description: Absence of new skin breakdown  Outcome: Met This Shift

## 2021-08-22 NOTE — PROGRESS NOTES
Patient displaying signs of increased confusion/agitation. Patient becoming verbally aggressive. Attempts made to redirect patient only last a short time. Will continue to monitor.

## 2021-08-23 ENCOUNTER — TELEPHONE (OUTPATIENT)
Dept: CARDIOLOGY CLINIC | Age: 61
End: 2021-08-23

## 2021-08-23 PROBLEM — Z76.89 ENCOUNTER FOR PSYCHIATRIC ASSESSMENT: Status: ACTIVE | Noted: 2021-08-23

## 2021-08-23 LAB
ANTI-NUCLEAR ANTIBODY (ANA): NEGATIVE
SARS-COV-2, NAAT: NOT DETECTED

## 2021-08-23 PROCEDURE — 2580000003 HC RX 258: Performed by: PSYCHIATRY & NEUROLOGY

## 2021-08-23 PROCEDURE — 6370000000 HC RX 637 (ALT 250 FOR IP): Performed by: FAMILY MEDICINE

## 2021-08-23 PROCEDURE — 99231 SBSQ HOSP IP/OBS SF/LOW 25: CPT | Performed by: NURSE PRACTITIONER

## 2021-08-23 PROCEDURE — 6360000002 HC RX W HCPCS: Performed by: PSYCHIATRY & NEUROLOGY

## 2021-08-23 PROCEDURE — 87635 SARS-COV-2 COVID-19 AMP PRB: CPT

## 2021-08-23 PROCEDURE — 6360000002 HC RX W HCPCS: Performed by: FAMILY MEDICINE

## 2021-08-23 PROCEDURE — 99233 SBSQ HOSP IP/OBS HIGH 50: CPT | Performed by: NURSE PRACTITIONER

## 2021-08-23 PROCEDURE — 6370000000 HC RX 637 (ALT 250 FOR IP): Performed by: PSYCHIATRY & NEUROLOGY

## 2021-08-23 PROCEDURE — 1200000000 HC SEMI PRIVATE

## 2021-08-23 RX ADMIN — Medication 10 ML: at 09:04

## 2021-08-23 RX ADMIN — ATORVASTATIN CALCIUM 40 MG: 40 TABLET, FILM COATED ORAL at 21:26

## 2021-08-23 RX ADMIN — ENOXAPARIN SODIUM 40 MG: 40 INJECTION SUBCUTANEOUS at 09:04

## 2021-08-23 RX ADMIN — ASPIRIN 81 MG CHEWABLE TABLET 81 MG: 81 TABLET CHEWABLE at 09:04

## 2021-08-23 RX ADMIN — Medication 10 ML: at 21:26

## 2021-08-23 RX ADMIN — Medication 1000 MCG: at 09:04

## 2021-08-23 RX ADMIN — METHYLPREDNISOLONE SODIUM SUCCINATE 1000 MG: 1 INJECTION, POWDER, LYOPHILIZED, FOR SOLUTION INTRAMUSCULAR; INTRAVENOUS at 10:40

## 2021-08-23 RX ADMIN — AMLODIPINE BESYLATE 5 MG: 5 TABLET ORAL at 09:04

## 2021-08-23 ASSESSMENT — PAIN SCALES - GENERAL
PAINLEVEL_OUTOF10: 0

## 2021-08-23 NOTE — PROGRESS NOTES
Chief Complaint:  Chief Complaint   Patient presents with    Altered Mental Status     patient DX with MD, per EMS family reported that he's been acting different, was driving car and told wife that he was going to kill them     Altered mental status     Subjective:    He remains totally confused and rambling. Somewhat pressured speech. Wife says he remains severely off his baseline. She is requesting transfer to [facility]. He is intermittently agitated and aggressive    Objective:    BP (!) 145/82   Pulse 83   Temp 97.6 °F (36.4 °C) (Oral)   Resp 20   Ht 5' 11\" (1.803 m)   Wt 285 lb (129.3 kg)   SpO2 96%   BMI 39.75 kg/m²     Current medications that patient is taking have been reviewed. General appearance: NAD, conversant  HEENT: AT/NC, MMM  Neck: FROM, supple  Lungs: Clear to auscultation, WOB normal  CV: RRR, no MRGs  Abdomen: Soft, non-tender; no masses or HSM, +BS  Extremities: No peripheral edema or digital cyanosis  Skin: no rash, lesions or ulcers  Psych: Calm and cooperative at the moment. Pressured speech. Rambling on tangents. Most responses incongruous to the content of the conversation. Neuro: Alert and interactive, face symmetric, moving all extremities, speech fluent. Extraocular movements intact. Handgrips 5-5 bilaterally. Foot plantar flexors 5 out of 5 bilaterally.     Labs:  CBC with Differential:    Lab Results   Component Value Date    WBC 7.2 08/21/2021    RBC 4.90 08/21/2021    HGB 15.2 08/21/2021    HCT 43.7 08/21/2021     08/22/2021    MCV 89.2 08/21/2021    MCH 31.0 08/21/2021    MCHC 34.8 08/21/2021    RDW 13.3 08/21/2021    SEGSPCT 85 11/07/2012    LYMPHOPCT 17.5 08/21/2021    MONOPCT 6.2 08/21/2021    BASOPCT 0.4 08/21/2021    MONOSABS 0.45 08/21/2021    LYMPHSABS 1.26 08/21/2021    EOSABS 0.09 08/21/2021    BASOSABS 0.03 08/21/2021     CMP:    Lab Results   Component Value Date     08/22/2021    K 3.7 08/22/2021    K 3.7 08/20/2021     08/22/2021    CO2 26 08/22/2021    BUN 23 08/22/2021    CREATININE 0.7 08/22/2021    GFRAA >60 08/22/2021    LABGLOM >60 08/22/2021    GLUCOSE 101 08/22/2021    GLUCOSE 91 03/10/2011    PROT 7.1 08/21/2021    LABALBU 3.8 08/21/2021    LABALBU 4.7 03/10/2011    CALCIUM 9.5 08/22/2021    BILITOT 0.5 08/21/2021    ALKPHOS 132 08/21/2021    AST 33 08/21/2021    ALT 34 08/21/2021          Assessment/Plan:  Principal Problem:    Altered mental status  Active Problems:    Elevated troponin    MD (muscular dystrophy) ([facility] [address]    Hypertension    Severe obesity (BMI 35.0-35.9 with comorbidity) ([facility] [address]  Resolved Problems:    * No resolved hospital problems. *       Remains unknown why he developed such acute and severe altered mental status. He is not a drinker or drug user reportedly. He apparently has no prior history of psychiatric illness. His LP was relatively unremarkable so would be hard to imagine him having encephalitis if he does not have \"itis\" in his CSF.     Neuro has started IV steroids    I have called the [place] center to try and arrange transfer to [facility] though his wife is aware this could take many days or not at all depending on their bed situation    Check thiamine, ceruloplasmin, and ferritin for other potential metabolic causes of altered mental status    Requires continued inpatient level of care     Chaparro Ray MD    2:39 PM  8/23/2021

## 2021-08-23 NOTE — PROGRESS NOTES
Requested Tele sitter, for this patient, but there are not any available at this time.  Patient has been add to the request list.

## 2021-08-23 NOTE — CARE COORDINATION
Neuro started on solumedrol, spoke with Dr. Elodia Krueger, medically cleared for transfer to psych if they feel he is appropriate. Patient remains very confused. For [facility]. For questions I can be reached at [phone].  Ollie Ruiz [place]

## 2021-08-23 NOTE — PROGRESS NOTES
Cooper Szymanski is a 64 y.o. left handed male     Neuro is following for an altered mental status    PMH: HTN, muscular dystrophy    Patient was admitted with increased confusion--over the past month he has become more focused on killing people and dying himself. Previously followed with  in 2015 for CMT--who recommended muscle biopsy given his elevated CKs and EMG findings in 2015. He has never undergone any genetic testing, and never followed through with muscle biopsy testing. +family hx of Alzheimer's dementia in his father at a young age. Has property with high grass and goes for walks at time--no known tick bite    Gross LP done and showed no WBCs or protein elevation. Meningitis panel was negative. Lyme and autoimmune enceph panel pending. He was started on 5 days of high dose IV solu medrol yesterday evening. He remains confused, and talking nonsensically and inappropriately at time--with no insight to this. He follows all commands. His wife is at the bedside--she stated he was started on Prozac in  for anxiety but no other new meds or illnesses. No previous hx of cog or major psych issues. His father was diagnosed with early dementia and  at age 59.     Wife adamantly wants him sent to [facility] if neuro workup here is unrevealing    Current Facility-Administered Medications   Medication Dose Route Frequency Provider Last Rate Last Admin    vitamin B-12 (CYANOCOBALAMIN) tablet 1,000 mcg  1,000 mcg Oral Daily Elta Jason, DO   1,000 mcg at 21 1515    methylPREDNISolone sodium (SOLU-MEDROL) 1,000 mg in sodium chloride 0.9 % 250 mL IVPB  1,000 mg Intravenous Daily Elta Jason, DO   Stopped at 21 1857    sodium chloride flush 0.9 % injection 5-40 mL  5-40 mL Intravenous 2 times per day Elta Jason, DO   5 mL at 21 2030    sodium chloride flush 0.9 % injection 5-40 mL  5-40 mL Intravenous PRN Indra Gomez DO        0.9 % sodium chloride infusion  25 mL Intravenous PRN Valdez Barrow DO        QUEtiapine (SEROQUEL) tablet 25 mg  25 mg Oral BID PRN Isabela Blackburn MD   25 mg at 08/22/21 2030    amLODIPine (NORVASC) tablet 5 mg  5 mg Oral Daily Donald Safia Learn, APRN - CNP   5 mg at 08/22/21 7967    traMADol (ULTRAM) tablet 50 mg  50 mg Oral Q6H PRN Donald Safia Learn, APRN - CNP        sodium chloride flush 0.9 % injection 5-40 mL  5-40 mL Intravenous 2 times per day Donald Safia Learn, APRN - CNP   5 mL at 08/21/21 2246    sodium chloride flush 0.9 % injection 10 mL  10 mL Intravenous PRN Donald Safia Learn, APRN - CNP        0.9 % sodium chloride infusion  25 mL Intravenous PRN Donald Safia Learn, APRN - CNP        ondansetron (ZOFRAN-ODT) disintegrating tablet 4 mg  4 mg Oral Q8H PRN Donald Safia Learn, APRN - CNP        Or    ondansetron (ZOFRAN) injection 4 mg  4 mg Intravenous Q6H PRN Donald Safia Learn, APRN - CNP        acetaminophen (TYLENOL) tablet 650 mg  650 mg Oral Q6H PRN Donald Safia Learn, APRN - CNP        Or    acetaminophen (TYLENOL) suppository 650 mg  650 mg Rectal Q6H PRN Donald Safia Learn, APRN - CNP        magnesium hydroxide (MILK OF MAGNESIA) 400 MG/5ML suspension 30 mL  30 mL Oral Daily PRN Donald Safia Learn, APRN - CNP        aspirin chewable tablet 81 mg  81 mg Oral Daily Donald Safia Learn, APRN - CNP   81 mg at 08/21/21 0906    enoxaparin (LOVENOX) injection 40 mg  40 mg Subcutaneous Daily Donald Safia Learn, APRN - CNP   40 mg at 08/21/21 3984    atorvastatin (LIPITOR) tablet 40 mg  40 mg Oral Nightly Donald Safia Learn, APRN - CNP   40 mg at 08/22/21 2030    potassium chloride (KLOR-CON M) extended release tablet 40 mEq  40 mEq Oral PRN Donald Safia Learn, APRN - CNP        Or    potassium bicarb-citric acid (EFFER-K) effervescent tablet 40 mEq  40 mEq Oral PRN Donald Safia Learn, APRN - CNP   40 mEq at 08/21/21 9804    Or    potassium chloride 10 mEq/100 mL IVPB (Peripheral Line)  10 mEq Intravenous PRN Donald Safia Learn, APRN - CNP        nitroGLYCERIN (NITROSTAT) SL tablet 0.4 mg  0.4 mg Sublingual Q5 Min PRN OSEAS Espinal - MIKE         Objective:     BP (!) 149/99   Pulse 88   Temp 97.9 °F (36.6 °C) (Oral)   Resp 22   Ht 5' 11\" (1.803 m)   Wt 285 lb (129.3 kg)   SpO2 97%   BMI 39.75 kg/m²      General appearance: Awake, cooperative, lying in bed in no distress  Head: Normocephalic, without obvious abnormality, atraumatic  Eyes: sclera clear  Neck: full ROM; no Lhermitte's  Heart: RRR  Lungs: clear throughout  Extremities: no cyanosis or edema  Pulses: 2+ and symmetric  Skin: no rashes or lesions    Mental Status: Awake, oriented to person, year--disoriented to month, place, situation    Intermittent inappropriate laughing  Fair attention/concentration  Follows all simple and two step commands fairly well    Speech: clear  Language: talks nonsensically at times with inappropriate sexual references, however does have intermittent appropriate statements.  Responses to questions and commands is appropriate    Cranial Nerves:  I: smell    II: visual acuity     II: visual fields Full   II: pupils JOSE   III,VII: ptosis None   III,IV,VI: extraocular muscles  EOMI without nystagmus    V: mastication Normal   V: facial light touch sensation  Normal   V,VII: corneal reflex     VII: facial muscle function - upper     VII: facial muscle function - lower Normal   VIII: hearing Normal   IX: soft palate elevation  Normal   IX,X: gag reflex    XI: trapezius strength  5/5   XI: sternocleidomastoid strength 5/5   XI: neck extension strength  5/5   XII: tongue strength  Normal     Motor:  5/5 throughout  Normal bulk and tone  No drift or abnormal movements    Sensory:  LT normal in all limbs    Coordination:   FN, FFM and JEAN without ataxia     DTR:   1+ throughout    No Babinski  No Mirza's     Bilateral palmar grasp  No suck reflex appreciated    Laboratory/Radiology:     CBC with Differential:    Lab Results   Component Value Date WBC 7.2 08/21/2021    RBC 4.90 08/21/2021    HGB 15.2 08/21/2021    HCT 43.7 08/21/2021     08/22/2021    MCV 89.2 08/21/2021    MCH 31.0 08/21/2021    MCHC 34.8 08/21/2021    RDW 13.3 08/21/2021    SEGSPCT 85 11/07/2012    LYMPHOPCT 17.5 08/21/2021    MONOPCT 6.2 08/21/2021    BASOPCT 0.4 08/21/2021    MONOSABS 0.45 08/21/2021    LYMPHSABS 1.26 08/21/2021    EOSABS 0.09 08/21/2021    BASOSABS 0.03 08/21/2021     CMP:    Lab Results   Component Value Date     08/22/2021    K 3.7 08/22/2021    K 3.7 08/20/2021     08/22/2021    CO2 26 08/22/2021    BUN 23 08/22/2021    CREATININE 0.7 08/22/2021    GFRAA >60 08/22/2021    LABGLOM >60 08/22/2021    GLUCOSE 101 08/22/2021    GLUCOSE 91 03/10/2011    PROT 7.1 08/21/2021    LABALBU 3.8 08/21/2021    LABALBU 4.7 03/10/2011    CALCIUM 9.5 08/22/2021    BILITOT 0.5 08/21/2021    ALKPHOS 132 08/21/2021    AST 33 08/21/2021    ALT 34 08/21/2021     HgBA1c:    Lab Results   Component Value Date    LABA1C 6.1 08/20/2021     FLP:    Lab Results   Component Value Date    TRIG 139 08/20/2021    HDL 44 08/20/2021    LDLCALC 78 08/20/2021    LABVLDL 28 08/20/2021     MRI Brain:  1. No acute intracranial abnormality. 2. No mass, mass effect, edema or hemorrhage. EEG: Normal      Ref.  Range 8/22/2021 12:10   Appearance, CSF Latest Ref Range: Clear  Clear   CSF Culture Unknown Growth not present, incubation continues   Glucose, CSF Latest Ref Range: 40 - 70 mg/dL 75 (H)   OLIGOCLONAL BANDING Unknown Rpt   Protein, CSF Latest Ref Range: 15 - 40 mg/dL 22   RBC, CSF Latest Units: /uL <2000   WBC, CSF Latest Ref Range: 0 - 2 /uL <3   Neutrophils, CSF Latest Ref Range: 0 - 10 % 50 (H)   Monocytes, CSF Latest Ref Range: 10 - 70 % 50   Color, CSF Unknown Colorless   Tube Number + CELL CT + DIFF-CSF Unknown Tube 2     Autoimmune enceph panel pending    Lyme pending    Final CSF studies pending    I personally reviewed the patient's lab and imaging studies at this time.    Assessment:     Altered mental status: unclear etiology. Neuro workup thus far with MRI and EEG, has been unrevealing, and gross LP w meningitis panel unremarkable. Lewy Body dementia, autoimmune encephalitis, acute psychosis--and less likely CJD--- must remain on the differential.  He does have concerning +family hx of early dementia. No improvement in mental status thus far on initiation of steroids, but will continue.      Muscular dystrophy    Plan:     Continue IV solu medrol 1000 mg for total of 5d--call if mental status worsens    F/U final CSF studies    Add on protein 14-3-3 and RT-QuIC to CSF (if able)    Consider PET scan    Discussed with Dr. Joseline Barroso and patient's wife    Wife adamantly wants pt transferred to [facility] if neuro workup here is unrevealing    Will follow    OSEAS Arizmendi - CNP  9:00 AM  8/23/2021

## 2021-08-23 NOTE — PROGRESS NOTES
The lab called to inform me the add on send out orders placed from the lumbar puncture could not be sent because the wasn't enough of the specimen to complete them. I called 5w to inform the new nurse taking care of him.

## 2021-08-23 NOTE — CONSULTS
Reason for consult: AMS, homicidal statements to family      Consulting Physician: Dr Bao MOLINA  Marv Calloway a 64 y. o. male presenting to the ED for ams/not acting himself, beginning unk ago.  The complaint has been persistent, moderate in severity, and worsened by nothing.  Ams/odd behavior.  Pt hx musc dystrophy.  Pt poor historian, hx per ems.  No fever/chills/sweats/n/v/d.  Family reports statements of hurting others. Psychiatry went to assess the patient this afternoon The patient and his wife were agreeable to speak with me and NP Sunil Morel. On assessment today the patient remains confused he is confabulating his responses and is extremely disorganized. He remains pleasant for assessment and the patients wife is able to gainfully contribute to the assessment. Patient denies SI/HI, he denies AVH. However the patient is exhibiting acute perceptual disturbances. His remote memory is intact, his immediate and recent is impaired. He does have a family history, of dementia. He is nonsensical, non-purposeful. Per the patients wife please see the following:    Patient's wife states that they were out grocery shopping going through their normal day. That she had ran into the grocery store and he remained in the car she stated that she was not in the store very long at all when she returned to the car he was acting bizarre and made statements towards her and talk to her in a manner that he has never done so in the past.  She states that they have been  for 40 years and in that 36 years he is never talked to her like this or behaved in any manner similar to how he displayed yesterday. She stated that she was very fearful of him and felt that she should have called the police. She states that when she did return to the car from the grocery store he yelled at her for taking too long in the store and called her \"dumb  bitch. \"  She states that they live 45 minutes from the shopping center they were at however it took over 3 hours for them to return home as he was not able to remember how to get home, he was driving erratically in traffic, and was not following her directions to get home. She states that he missed multiple exits and turned the wrong direction multiple times. She stated that he was driving erratically in traffic at a high rate of speed and was very angry. She stated that he was rambling and tangential.  She stated that he was speaking about his father that  in , his mood was very labile and she stated he she was fearful because he was so unpredictable. She again stresses that she has never experienced any kind of behaviors like this before from him and that she was terrified. She tells me that he was started on Prozac 20 mg unknown time ago for reports of \"anxiety. \"  She has concerns that this medication may be contributing to the change in personality. However due to the abruptness and change in behavior and orientation it is unlikely this is caused by the Prozac. However as a safety and out of caution for the patient we will recommend not continuing the Prozac at this time. Psychiatry is highly concerned for underlying medical condition or neurologic problem due to the abrupt change in mentation, and personality. MSE  Mental status examination reveals a 60-year-old  male who appears his stated age is superficially forthcoming and cooperative for assessment despite clear confusion. Psychomotor reveals no agitation retardation involuntary movement or abnormal posture. Speech is clear, however he is confabulating he is unable to answer questions with relevance. Eye contact is good during assessment. Mood is not stated during this encounter. Affect is relaxed and pleasant. Thought process is confused. Thought content is  the patient is devoid of suicidal or homicidal ideation intent or plan. Devoid of auditory or visual hallucinations  hallucinations.   Devoid of suicidal or homicidal ideation intent or plan though the patient's family reports that he has made homicidal statements  towards his family, the patient has no recollection of doing so. Remote memory is intact through conversation however immediate and recent memory seems to be impaired. Cognitive function appears to be below the baseline. Insight, judgement and impulse control are poor. The patient is intermittently alert x 4 however this varies with his level of lucidity. DX  Encounter for psychiatric assessment  Highly suspicious of neurologic cause   Highly suspicious of dementia/cognitive disorder    Plan/Recommendations: The patient case, plan of care and recommendations has been discussed with Dr Lamont Rausch and the collaborative psychiatric care team.     The patient is not suicidal, homicidal psychotic or manic and does not meet criteria for inpatient psychiatric hospitalization. Psychiatry signs off. Thank you for the consult.

## 2021-08-23 NOTE — PROGRESS NOTES
Per [facility] request result of COVID test has been faxed to [facility] access center, Fax # [phone].

## 2021-08-24 LAB
ALBUMIN SERPL-MCNC: 3.9 G/DL (ref 3.5–5.2)
ALP BLD-CCNC: 117 U/L (ref 40–129)
ALT SERPL-CCNC: 32 U/L (ref 0–40)
ANION GAP SERPL CALCULATED.3IONS-SCNC: 13 MMOL/L (ref 7–16)
AST SERPL-CCNC: 27 U/L (ref 0–39)
BASOPHILS ABSOLUTE: 0.01 E9/L (ref 0–0.2)
BASOPHILS RELATIVE PERCENT: 0.1 % (ref 0–2)
BILIRUB SERPL-MCNC: 0.4 MG/DL (ref 0–1.2)
BUN BLDV-MCNC: 32 MG/DL (ref 6–23)
CALCIUM SERPL-MCNC: 9.3 MG/DL (ref 8.6–10.2)
CHLORIDE BLD-SCNC: 101 MMOL/L (ref 98–107)
CO2: 24 MMOL/L (ref 22–29)
CREAT SERPL-MCNC: 0.7 MG/DL (ref 0.7–1.2)
EOSINOPHILS ABSOLUTE: 0 E9/L (ref 0.05–0.5)
EOSINOPHILS RELATIVE PERCENT: 0 % (ref 0–6)
FERRITIN: 364 NG/ML
GFR AFRICAN AMERICAN: >60
GFR NON-AFRICAN AMERICAN: >60 ML/MIN/1.73
GLUCOSE BLD-MCNC: 207 MG/DL (ref 74–99)
HCT VFR BLD CALC: 42.9 % (ref 37–54)
HEMOGLOBIN: 15 G/DL (ref 12.5–16.5)
IMMATURE GRANULOCYTES #: 0.14 E9/L
IMMATURE GRANULOCYTES %: 0.9 % (ref 0–5)
LYME, EIA: 0.21 LIV (ref 0–1.2)
LYMPHOCYTES ABSOLUTE: 1.15 E9/L (ref 1.5–4)
LYMPHOCYTES RELATIVE PERCENT: 7.4 % (ref 20–42)
MCH RBC QN AUTO: 30.8 PG (ref 26–35)
MCHC RBC AUTO-ENTMCNC: 35 % (ref 32–34.5)
MCV RBC AUTO: 88.1 FL (ref 80–99.9)
MONOCYTES ABSOLUTE: 0.85 E9/L (ref 0.1–0.95)
MONOCYTES RELATIVE PERCENT: 5.4 % (ref 2–12)
NEUTROPHILS ABSOLUTE: 13.49 E9/L (ref 1.8–7.3)
NEUTROPHILS RELATIVE PERCENT: 86.2 % (ref 43–80)
PDW BLD-RTO: 13.3 FL (ref 11.5–15)
PLATELET # BLD: 268 E9/L (ref 130–450)
PMV BLD AUTO: 10.1 FL (ref 7–12)
POTASSIUM SERPL-SCNC: 3.5 MMOL/L (ref 3.5–5)
RBC # BLD: 4.87 E12/L (ref 3.8–5.8)
SODIUM BLD-SCNC: 138 MMOL/L (ref 132–146)
TOTAL PROTEIN: 7 G/DL (ref 6.4–8.3)
WBC # BLD: 15.6 E9/L (ref 4.5–11.5)

## 2021-08-24 PROCEDURE — 6370000000 HC RX 637 (ALT 250 FOR IP): Performed by: INTERNAL MEDICINE

## 2021-08-24 PROCEDURE — 1200000000 HC SEMI PRIVATE

## 2021-08-24 PROCEDURE — 80053 COMPREHEN METABOLIC PANEL: CPT

## 2021-08-24 PROCEDURE — 36415 COLL VENOUS BLD VENIPUNCTURE: CPT

## 2021-08-24 PROCEDURE — 6360000002 HC RX W HCPCS: Performed by: PSYCHIATRY & NEUROLOGY

## 2021-08-24 PROCEDURE — 6360000002 HC RX W HCPCS: Performed by: FAMILY MEDICINE

## 2021-08-24 PROCEDURE — 82728 ASSAY OF FERRITIN: CPT

## 2021-08-24 PROCEDURE — 6370000000 HC RX 637 (ALT 250 FOR IP): Performed by: FAMILY MEDICINE

## 2021-08-24 PROCEDURE — 6370000000 HC RX 637 (ALT 250 FOR IP): Performed by: PSYCHIATRY & NEUROLOGY

## 2021-08-24 PROCEDURE — 84425 ASSAY OF VITAMIN B-1: CPT

## 2021-08-24 PROCEDURE — 82390 ASSAY OF CERULOPLASMIN: CPT

## 2021-08-24 PROCEDURE — 85025 COMPLETE CBC W/AUTO DIFF WBC: CPT

## 2021-08-24 PROCEDURE — 2580000003 HC RX 258: Performed by: PSYCHIATRY & NEUROLOGY

## 2021-08-24 RX ADMIN — Medication 10 ML: at 08:22

## 2021-08-24 RX ADMIN — QUETIAPINE FUMARATE 25 MG: 25 TABLET ORAL at 21:50

## 2021-08-24 RX ADMIN — Medication 10 ML: at 20:28

## 2021-08-24 RX ADMIN — AMLODIPINE BESYLATE 5 MG: 5 TABLET ORAL at 08:23

## 2021-08-24 RX ADMIN — ATORVASTATIN CALCIUM 40 MG: 40 TABLET, FILM COATED ORAL at 20:28

## 2021-08-24 RX ADMIN — METHYLPREDNISOLONE SODIUM SUCCINATE 1000 MG: 1 INJECTION, POWDER, LYOPHILIZED, FOR SOLUTION INTRAMUSCULAR; INTRAVENOUS at 08:23

## 2021-08-24 RX ADMIN — ASPIRIN 81 MG CHEWABLE TABLET 81 MG: 81 TABLET CHEWABLE at 08:22

## 2021-08-24 RX ADMIN — TRAMADOL HYDROCHLORIDE 50 MG: 50 TABLET, FILM COATED ORAL at 10:37

## 2021-08-24 RX ADMIN — ENOXAPARIN SODIUM 40 MG: 40 INJECTION SUBCUTANEOUS at 08:22

## 2021-08-24 RX ADMIN — Medication 1000 MCG: at 08:23

## 2021-08-24 ASSESSMENT — PAIN SCALES - GENERAL
PAINLEVEL_OUTOF10: 0
PAINLEVEL_OUTOF10: 0
PAINLEVEL_OUTOF10: 7

## 2021-08-24 NOTE — PROGRESS NOTES
Chief Complaint:  Chief Complaint   Patient presents with    Altered Mental Status     patient DX with MD, per EMS family reported that he's been acting different, was driving car and told wife that he was going to kill them     Altered mental status     Subjective:    He remains totally confused and rambling. Somewhat pressured speech. No improvement compared to yesterday. Objective:    /76   Pulse 93   Temp 98.2 °F (36.8 °C) (Temporal)   Resp 18   Ht 5' 11\" (1.803 m)   Wt 282 lb 9.6 oz (128.2 kg)   SpO2 95%   BMI 39.41 kg/m²     Current medications that patient is taking have been reviewed. General appearance: NAD, conversant  HEENT: AT/NC, MMM  Neck: FROM, supple  Lungs: Clear to auscultation, WOB normal  CV: RRR, no MRGs  Abdomen: Soft, non-tender; no masses or HSM, +BS  Extremities: No peripheral edema or digital cyanosis  Skin: no rash, lesions or ulcers  Psych: Calm and cooperative at the moment. Pressured speech. Rambling on tangents. Most responses incongruous to the content of the conversation. Neuro: Alert and interactive, face symmetric, moving all extremities, speech fluent. Extraocular movements intact. Previously A&Ox3 at times.  Today he isn't really answering orientation questions with any logical response (just says string of random numbers when asked the date, for example)    Labs:  CBC with Differential:    Lab Results   Component Value Date    WBC 15.6 08/24/2021    RBC 4.87 08/24/2021    HGB 15.0 08/24/2021    HCT 42.9 08/24/2021     08/24/2021    MCV 88.1 08/24/2021    MCH 30.8 08/24/2021    MCHC 35.0 08/24/2021    RDW 13.3 08/24/2021    SEGSPCT 85 11/07/2012    LYMPHOPCT 7.4 08/24/2021    MONOPCT 5.4 08/24/2021    BASOPCT 0.1 08/24/2021    MONOSABS 0.85 08/24/2021    LYMPHSABS 1.15 08/24/2021    EOSABS 0.00 08/24/2021    BASOSABS 0.01 08/24/2021     CMP:    Lab Results   Component Value Date     08/24/2021    K 3.5 08/24/2021    K 3.7 08/20/2021    CL 101 08/24/2021    CO2 24 08/24/2021    BUN 32 08/24/2021    CREATININE 0.7 08/24/2021    GFRAA >60 08/24/2021    LABGLOM >60 08/24/2021    GLUCOSE 207 08/24/2021    GLUCOSE 91 03/10/2011    PROT 7.0 08/24/2021    LABALBU 3.9 08/24/2021    LABALBU 4.7 03/10/2011    CALCIUM 9.3 08/24/2021    BILITOT 0.4 08/24/2021    ALKPHOS 117 08/24/2021    AST 27 08/24/2021    ALT 32 08/24/2021          Assessment/Plan:  Principal Problem:    Altered mental status  Active Problems:    Elevated troponin    MD (muscular dystrophy) ([facility] [address]    Hypertension    Severe obesity (BMI 35.0-35.9 with comorbidity) ([facility] [address]    Encounter for psychiatric assessment  Resolved Problems:    * No resolved hospital problems. *       Remains unknown why he developed such acute and severe altered mental status. He is not a drinker or drug user reportedly. He apparently has no prior history of psychiatric illness. His LP was relatively unremarkable so would be hard to imagine him having encephalitis if he does not have \"itis\" in his CSF.     Neuro has started IV steroids    Await thiamine, ceruloplasmin, and ferritin for other potential metabolic causes of altered mental status    Accepted to [facility], awaiting bed    Requires continued inpatient level of care     Luna Ray MD    1:55 PM  8/24/2021

## 2021-08-24 NOTE — CARE COORDINATION
Discharge plan is to transfer pt to [facility]. Envelope and ambulance form completed and placed in soft chart.  Luis Humphries -[phone]

## 2021-08-25 PROCEDURE — 2580000003 HC RX 258: Performed by: PSYCHIATRY & NEUROLOGY

## 2021-08-25 PROCEDURE — 1200000000 HC SEMI PRIVATE

## 2021-08-25 PROCEDURE — 99233 SBSQ HOSP IP/OBS HIGH 50: CPT | Performed by: NURSE PRACTITIONER

## 2021-08-25 PROCEDURE — 6360000002 HC RX W HCPCS: Performed by: PSYCHIATRY & NEUROLOGY

## 2021-08-25 PROCEDURE — 86592 SYPHILIS TEST NON-TREP QUAL: CPT

## 2021-08-25 PROCEDURE — 36415 COLL VENOUS BLD VENIPUNCTURE: CPT

## 2021-08-25 PROCEDURE — 6360000002 HC RX W HCPCS: Performed by: FAMILY MEDICINE

## 2021-08-25 PROCEDURE — 6370000000 HC RX 637 (ALT 250 FOR IP): Performed by: FAMILY MEDICINE

## 2021-08-25 PROCEDURE — 6370000000 HC RX 637 (ALT 250 FOR IP): Performed by: PSYCHIATRY & NEUROLOGY

## 2021-08-25 PROCEDURE — 2580000003 HC RX 258: Performed by: FAMILY MEDICINE

## 2021-08-25 PROCEDURE — 6370000000 HC RX 637 (ALT 250 FOR IP): Performed by: INTERNAL MEDICINE

## 2021-08-25 RX ORDER — FLUCONAZOLE 100 MG/1
800 TABLET ORAL DAILY
Status: DISCONTINUED | OUTPATIENT
Start: 2021-08-25 | End: 2021-08-27

## 2021-08-25 RX ADMIN — QUETIAPINE FUMARATE 25 MG: 25 TABLET ORAL at 21:04

## 2021-08-25 RX ADMIN — QUETIAPINE FUMARATE 25 MG: 25 TABLET ORAL at 13:21

## 2021-08-25 RX ADMIN — Medication 10 ML: at 10:12

## 2021-08-25 RX ADMIN — ENOXAPARIN SODIUM 40 MG: 40 INJECTION SUBCUTANEOUS at 08:56

## 2021-08-25 RX ADMIN — Medication 1000 MCG: at 08:56

## 2021-08-25 RX ADMIN — AMLODIPINE BESYLATE 5 MG: 5 TABLET ORAL at 08:56

## 2021-08-25 RX ADMIN — TRAMADOL HYDROCHLORIDE 50 MG: 50 TABLET, FILM COATED ORAL at 21:04

## 2021-08-25 RX ADMIN — FLUCONAZOLE 800 MG: 100 TABLET ORAL at 17:39

## 2021-08-25 RX ADMIN — Medication 10 ML: at 10:11

## 2021-08-25 RX ADMIN — ATORVASTATIN CALCIUM 40 MG: 40 TABLET, FILM COATED ORAL at 21:04

## 2021-08-25 RX ADMIN — METHYLPREDNISOLONE SODIUM SUCCINATE 1000 MG: 1 INJECTION, POWDER, LYOPHILIZED, FOR SOLUTION INTRAMUSCULAR; INTRAVENOUS at 08:56

## 2021-08-25 RX ADMIN — Medication 10 ML: at 20:21

## 2021-08-25 ASSESSMENT — PAIN DESCRIPTION - DESCRIPTORS: DESCRIPTORS: ACHING;CONSTANT;DISCOMFORT

## 2021-08-25 ASSESSMENT — PAIN DESCRIPTION - PAIN TYPE: TYPE: CHRONIC PAIN

## 2021-08-25 ASSESSMENT — PAIN DESCRIPTION - LOCATION: LOCATION: LEG

## 2021-08-25 ASSESSMENT — PAIN DESCRIPTION - ONSET: ONSET: ON-GOING

## 2021-08-25 ASSESSMENT — PAIN SCALES - GENERAL
PAINLEVEL_OUTOF10: 0
PAINLEVEL_OUTOF10: 7
PAINLEVEL_OUTOF10: 7

## 2021-08-25 ASSESSMENT — PAIN DESCRIPTION - ORIENTATION: ORIENTATION: RIGHT;LEFT

## 2021-08-25 ASSESSMENT — PAIN DESCRIPTION - FREQUENCY: FREQUENCY: CONTINUOUS

## 2021-08-25 NOTE — CONSULTS
Department of Internal Medicine  Infectious Diseases   Consult Note      Reason for Consult:   Encephalopathy ? Yeast in the CSF     Requesting Physician:  Dr Darvin Marroquin:              Pt is very confused and delirious at this time. Discussed with Dr Dede Kim . This is a 64 yrs old male with hx of muscular dystrophy and HTN presented to the ER with acute mental status change ( on 8 / 19 ) . He was not acting himself and exhibiting odd behavior . Pt was evaluated by Neurology - LP done - CSF  Protein 22, Glucose 75, WBC 3, N-50 %   Cytology showed budding yeast     Pt denies headache, nausea,vomiting, fever and chills at present .     Past Medical History:      Past Medical History:   Diagnosis Date    Hypertension     MD (muscular dystrophy) ([facility] [address]     Obesity (BMI 35.0-39.9 without comorbidity) 8/20/2021       Past Surgical History:      Past Surgical History:   Procedure Laterality Date    CYST REMOVAL      on tailbone when was a child     Current Medications:      Current Facility-Administered Medications   Medication Dose Route Frequency Provider Last Rate Last Admin    vitamin B-12 (CYANOCOBALAMIN) tablet 1,000 mcg  1,000 mcg Oral Daily Laurina Kazakh, DO   1,000 mcg at 08/25/21 0856    sodium chloride flush 0.9 % injection 5-40 mL  5-40 mL IntraVENous 2 times per day Laurina Kazakh, DO   10 mL at 08/25/21 1012    sodium chloride flush 0.9 % injection 5-40 mL  5-40 mL IntraVENous PRN Cristopher Garcia, DO        0.9 % sodium chloride infusion  25 mL IntraVENous PRN Chrissina Kazakh, DO        QUEtiapine (SEROQUEL) tablet 25 mg  25 mg Oral BID PRN Huber Mcnamara MD   25 mg at 08/25/21 1321    amLODIPine (NORVASC) tablet 5 mg  5 mg Oral Daily Edita Larson, APRN - CNP   5 mg at 08/25/21 0856    traMADol (ULTRAM) tablet 50 mg  50 mg Oral Q6H PRN Edita Larson, APRN - CNP   50 mg at 08/24/21 1037    sodium chloride flush 0.9 % injection 5-40 mL  5-40 mL IntraVENous 2 times per day Magdalen Pour Learn, APRN - CNP   10 mL at 08/25/21 1011    sodium chloride flush 0.9 % injection 10 mL  10 mL IntraVENous PRN Magdalen Pour Learn, APRN - CNP        0.9 % sodium chloride infusion  25 mL IntraVENous PRN Magdalen Pour Learn, APRN - CNP        ondansetron (ZOFRAN-ODT) disintegrating tablet 4 mg  4 mg Oral Q8H PRN Magdalen Pour Learn, APRN - CNP        Or    ondansetron (ZOFRAN) injection 4 mg  4 mg IntraVENous Q6H PRN Magdalen Pour Learn, APRN - CNP        acetaminophen (TYLENOL) tablet 650 mg  650 mg Oral Q6H PRN Magdalen Pour Learn, APRN - CNP        Or    acetaminophen (TYLENOL) suppository 650 mg  650 mg Rectal Q6H PRN Magdalen Pour Learn, APRN - CNP        magnesium hydroxide (MILK OF MAGNESIA) 400 MG/5ML suspension 30 mL  30 mL Oral Daily PRN Magdalen Pour Learn, APRN - CNP        aspirin chewable tablet 81 mg  81 mg Oral Daily Magdalen Pour Learn, APRN - CNP   81 mg at 08/24/21 7096    enoxaparin (LOVENOX) injection 40 mg  40 mg Subcutaneous Daily Magdalen Pour Learn, APRN - CNP   40 mg at 08/25/21 0856    atorvastatin (LIPITOR) tablet 40 mg  40 mg Oral Nightly Magdalen Pour Learn, APRN - CNP   40 mg at 08/24/21 2028    potassium chloride (KLOR-CON M) extended release tablet 40 mEq  40 mEq Oral PRN Magdalen Pour Learn, APRN - CNP        Or    potassium bicarb-citric acid (EFFER-K) effervescent tablet 40 mEq  40 mEq Oral PRN Magdalen Pour Learn, APRN - CNP   40 mEq at 08/21/21 9219    Or    potassium chloride 10 mEq/100 mL IVPB (Peripheral Line)  10 mEq IntraVENous PRN Magdalen Pour Learn, APRN - CNP        nitroGLYCERIN (NITROSTAT) SL tablet 0.4 mg  0.4 mg Sublingual Q5 Min PRN Magdalen Pour Learn, APRN - CNP           Allergies:  Hydrocodone    Social History:      Social History     Socioeconomic History    Marital status:      Spouse name: Not on file    Number of children: Not on file    Years of education: Not on file    Highest education level: Not on file   Occupational History    Not on file   Tobacco Use    Smoking status: Never Smoker    Smokeless tobacco: Never Used   Substance and Sexual Activity    Alcohol use: Yes     Comment: Occasionally    Drug use: No    Sexual activity: Yes     Partners: Female   Other Topics Concern    Not on file   Social History Narrative    Not on file     Social Determinants of Health     Financial Resource Strain:     Difficulty of Paying Living Expenses:    Food Insecurity:     Worried About Running Out of Food in the Last Year:     [address] in the Last Year:    Transportation Needs:     Lack of Transportation (Medical):  Lack of Transportation (Non-Medical):    Physical Activity:     Days of Exercise per Week:     Minutes of Exercise per Session:    Stress:     Feeling of Stress :    Social Connections:     Frequency of Communication with Friends and Family:     Frequency of Social Gatherings with Friends and Family:     Attends [employer] Services:     Active Member of Clubs or Organizations:     Attends Club or Organization Meetings:     Marital Status:    Intimate Partner Violence:     Fear of Current or Ex-Partner:     Emotionally Abused:     Physically Abused:     Sexually Abused:          Family History:    Not pertinent to present illness     REVIEW OF SYSTEMS:      CONSTITUTIONAL:  Denies fever, chill or rigors  HEENT: Denies headache, neck pain , photophobia   RESPIRATORY: denies cough, shortness of breath, sputum expectoration, chest pain. CARDIOVASCULAR:  Denies palpitation  GASTROINTESTINAL:  Denies abdomen pain, diarrhea or constipation. GENITOURINARY:  Denies burning urination or frequency of urination  INTEGUMENT: denies wound , rash  HEMATOLOGIC/LYMPHATIC:  Denies lymph node swelling, gum bleeding or easy bruising.   MUSCULOSKELETAL:  Denies leg pain , joint pain , joint swelling  NEUROLOGICAL:  Change in mental status     PHYSICAL EXAM:      Vitals:     Vitals:    08/25/21 0801   BP: 125/60   Pulse: 76   Resp: PROTIME 11.6 08/22/2021    PROTIME 11.3 11/02/2010    INR 1.1 08/22/2021       TSH:    Lab Results   Component Value Date    TSH 1.270 08/20/2021       U/A:    Lab Results   Component Value Date    COLORU Yellow 08/19/2021    PHUR 8.0 08/19/2021    WBCUA 1-3 08/19/2021    WBCUA 0-1 03/10/2011    RBCUA NONE 08/19/2021    RBCUA 10-20 11/07/2012    BACTERIA RARE 08/19/2021    CLARITYU Clear 08/19/2021    SPECGRAV 1.015 08/19/2021    LEUKOCYTESUR TRACE 08/19/2021    UROBILINOGEN 2.0 08/19/2021    BILIRUBINUR Negative 08/19/2021    BILIRUBINUR NEGATIVE 03/10/2011    BLOODU Negative 08/19/2021    GLUCOSEU Negative 08/19/2021    GLUCOSEU NEGATIVE 03/10/2011    AMORPHOUS MANY 11/07/2012       ABG:  No results found for: MNN7YHW, BEART, Q3XKQGKZ, PHART, THGBART, OAV4XVE, PO2ART, UUE2SGM    MICROBIOLOGY:    CSF - cytology     DIAGNOSIS   NEGATIVE FOR MALIGNANT CELLS     Rare budding yeast forms in association with anucleate squames in an   otherwise acellular specimen, see comment. CSF cx neg to date   CSF - encephalitis panel neg       Radiology :    MRI brain -  Impression:        1. No acute intracranial abnormality. 2. No mass, mass effect, edema or hemorrhage. IMPRESSION:     1. Acute confusional state - CSF unremarkable , but cytology budding yeast with acellular specimen. 2. Leukocytosis     RECOMMENDATIONS:      1. Diflucan 800 mg po q 24 hrs, await CSF crypto antigen test   2. RPR test     Thank you Dr Blaine Garrison for the consult .

## 2021-08-25 NOTE — PROGRESS NOTES
Chief Complaint:  Chief Complaint   Patient presents with    Altered Mental Status     patient DX with MD, per EMS family reported that he's been acting different, was driving car and told wife that he was going to kill them     Altered mental status     Subjective:    He remains totally confused and rambling. Somewhat pressured speech. No improvement compared to yesterday. Objective:    /78   Pulse 80   Temp 96.5 °F (35.8 °C) (Temporal)   Resp 18   Ht 5' 11\" (1.803 m)   Wt 280 lb 6.4 oz (127.2 kg)   SpO2 93%   BMI 39.11 kg/m²     Current medications that patient is taking have been reviewed. General appearance: NAD, conversant  HEENT: AT/NC, MMM  Neck: FROM, supple  Lungs: Clear to auscultation, WOB normal  CV: RRR, no MRGs  Abdomen: Soft, non-tender; no masses or HSM, +BS  Extremities: No peripheral edema or digital cyanosis  Skin: no rash, lesions or ulcers  Psych: Calm and cooperative at the moment. Pressured speech. Rambling on tangents. Most responses incongruous to the content of the conversation. Neuro: Alert and interactive, face symmetric, moving all extremities, speech fluent.     Labs:  CBC with Differential:    Lab Results   Component Value Date    WBC 15.6 08/24/2021    RBC 4.87 08/24/2021    HGB 15.0 08/24/2021    HCT 42.9 08/24/2021     08/24/2021    MCV 88.1 08/24/2021    MCH 30.8 08/24/2021    MCHC 35.0 08/24/2021    RDW 13.3 08/24/2021    SEGSPCT 85 11/07/2012    LYMPHOPCT 7.4 08/24/2021    MONOPCT 5.4 08/24/2021    BASOPCT 0.1 08/24/2021    MONOSABS 0.85 08/24/2021    LYMPHSABS 1.15 08/24/2021    EOSABS 0.00 08/24/2021    BASOSABS 0.01 08/24/2021     CMP:    Lab Results   Component Value Date     08/24/2021    K 3.5 08/24/2021    K 3.7 08/20/2021     08/24/2021    CO2 24 08/24/2021    BUN 32 08/24/2021    CREATININE 0.7 08/24/2021    GFRAA >60 08/24/2021    LABGLOM >60 08/24/2021    GLUCOSE 207 08/24/2021    GLUCOSE 91 03/10/2011    PROT 7.0 08/24/2021 LABALBU 3.9 08/24/2021    LABALBU 4.7 03/10/2011    CALCIUM 9.3 08/24/2021    BILITOT 0.4 08/24/2021    ALKPHOS 117 08/24/2021    AST 27 08/24/2021    ALT 32 08/24/2021          Assessment/Plan:  Principal Problem:    Altered mental status  Active Problems:    Elevated troponin    MD (muscular dystrophy) ([facility] [address]    Hypertension    Severe obesity (BMI 35.0-35.9 with comorbidity) [facility]    Encounter for psychiatric assessment  Resolved Problems:    * No resolved hospital problems. *       Remains totally confused. Yeast seen on CSF cytology which is unusual although suppose could be contaminant. No WBC's in CSF makes me suspect this might be contaminant. Crypto Ag in serum and CSF pending. ID consult.     IV steroids per Neuro    Accepted to [facility], awaiting bed    Requires continued inpatient level of care     Isabela Blackburn MD    5:31 PM  8/25/2021

## 2021-08-25 NOTE — PROGRESS NOTES
Notified Dannie Bateman, of [facility] of consult, wait for doctor to be assigned,

## 2021-08-25 NOTE — PROGRESS NOTES
Leighann Mondragon is a 64 y.o. left handed male     Neuro is following for an altered mental status    PMH: HTN, muscular dystrophy    Patient was admitted with increased confusion--over the past month he has become more focused on killing people and dying himself. Previously followed with  in 2015 for CMT--who recommended muscle biopsy given his elevated CKs and EMG findings in 2015. He has never undergone any genetic testing, and never followed through with muscle biopsy testing. +family hx of Alzheimer's dementia in his father at a young age (62). Has property with high grass and goes for walks at time--no known tick bite    He is awaiting transfer to [facility] for additional neurologic opinion. No improvement in his mental status after three doses of IV solu medrol 1000 mg. He continues to [facility] but follows all commands upon further questioning of his wife, over the past year or 2 he has had some sleep disturbances--being up and down multiple times per night. 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 She is also noticed mood changes over the past year. Unfortunately, CJD labs were not able to be added to CSF    His wife is uncertain of the patient's father's presenting symptoms at the time of his dementia diagnosis. He is otherwise medically stable.     Current Facility-Administered Medications   Medication Dose Route Frequency Provider Last Rate Last Admin    vitamin B-12 (CYANOCOBALAMIN) tablet 1,000 mcg  1,000 mcg Oral Daily Herchel Oz, DO   1,000 mcg at 08/25/21 0856    methylPREDNISolone sodium (SOLU-MEDROL) 1,000 mg in sodium chloride 0.9 % 250 mL IVPB  1,000 mg IntraVENous Daily Herchel Oz,  mL/hr at 08/25/21 0856 1,000 mg at 08/25/21 0856    sodium chloride flush 0.9 % injection 5-40 mL  5-40 mL IntraVENous 2 times per day Herchel Oz, DO   10 mL at 08/24/21 2028    sodium chloride flush 0.9 % injection 5-40 mL  5-40 mL IntraVENous PRN Dena Murders, DO        0.9 % sodium chloride infusion  25 mL IntraVENous PRN Dena Murders, DO        QUEtiapine (SEROQUEL) tablet 25 mg  25 mg Oral BID PRN Daisha Corral MD   25 mg at 08/24/21 2150    amLODIPine (NORVASC) tablet 5 mg  5 mg Oral Daily Lemmie Lorraine Learn, APRN - CNP   5 mg at 08/25/21 0856    traMADol (ULTRAM) tablet 50 mg  50 mg Oral Q6H PRN Lemmie Lorraine Learn, APRN - CNP   50 mg at 08/24/21 1037    sodium chloride flush 0.9 % injection 5-40 mL  5-40 mL IntraVENous 2 times per day Lemmie Lorraine Learn, APRN - CNP   5 mL at 08/21/21 2246    sodium chloride flush 0.9 % injection 10 mL  10 mL IntraVENous PRN Lemmie Mari Learn, APRN - CNP        0.9 % sodium chloride infusion  25 mL IntraVENous PRN Lemmie Mari Learn, APRN - CNP        ondansetron (ZOFRAN-ODT) disintegrating tablet 4 mg  4 mg Oral Q8H PRN Lemmie Mari Learn, APRN - CNP        Or    ondansetron (ZOFRAN) injection 4 mg  4 mg IntraVENous Q6H PRN Lemmie Mari Learn, APRN - CNP        acetaminophen (TYLENOL) tablet 650 mg  650 mg Oral Q6H PRN Lemmie Mari Learn, APRN - CNP        Or    acetaminophen (TYLENOL) suppository 650 mg  650 mg Rectal Q6H PRN Lemmie Mari Learn, APRN - CNP        magnesium hydroxide (MILK OF MAGNESIA) 400 MG/5ML suspension 30 mL  30 mL Oral Daily PRN Lemmie Mari Learn, APRN - CNP        aspirin chewable tablet 81 mg  81 mg Oral Daily Lemmie Mari Learn, APRN - CNP   81 mg at 08/24/21 4705    enoxaparin (LOVENOX) injection 40 mg  40 mg Subcutaneous Daily Lemmie Mari Learn, APRN - CNP   40 mg at 08/25/21 0856    atorvastatin (LIPITOR) tablet 40 mg  40 mg Oral Nightly Lemmie Mari Learn, APRN - CNP   40 mg at 08/24/21 2028    potassium chloride (KLOR-CON M) extended release tablet 40 mEq  40 mEq Oral PRN Lemmie Mari Learn, APRN - CNP        Or    potassium bicarb-citric acid (EFFER-K) effervescent tablet 40 mEq  40 mEq Oral PRN OSEAS Reilly - CNP   40 mEq at 08/21/21 0905    Or    potassium chloride 10 mEq/100 mL IVPB (Peripheral Line)  10 mEq IntraVENous PRN Aldo Delgado Learn, APRN - CNP        nitroGLYCERIN (NITROSTAT) SL tablet 0.4 mg  0.4 mg Sublingual Q5 Min PRN OSEAS Zee - MIKE         Objective:     /60   Pulse 76   Temp 97 °F (36.1 °C) (Infrared)   Resp 16   Ht 5' 11\" (1.803 m)   Wt 280 lb 6.4 oz (127.2 kg)   SpO2 97%   BMI 39.11 kg/m²      General appearance: Awake, cooperative, lying in bed in no distress  Head: Normocephalic, without obvious abnormality, atraumatic  Eyes: sclera clear  Neck: full ROM; no Lhermitte's  Heart: RRR  Lungs: clear throughout  Extremities: no cyanosis or edema  Pulses: 2+ and symmetric  Skin: no rashes or lesions    Mental Status: Awake, oriented to person, date--does not know he is in the hospital and states it is 2025    Intermittent inappropriate laughing  Fair attention/concentration  Follows all simple and two step commands fairly well    Speech: Mildly dysarthric  Language: talks nonsensically at times with inappropriate sexual references, however does have intermittent appropriate statements.  Responses to questions and commands is appropriate    Cranial Nerves:  I: smell    II: visual acuity     II: visual fields Full   II: pupils JOSE   III,VII: ptosis None   III,IV,VI: extraocular muscles  EOMI without nystagmus    V: mastication Normal   V: facial light touch sensation  Normal   V,VII: corneal reflex     VII: facial muscle function - upper     VII: facial muscle function - lower Normal   VIII: hearing Normal   IX: soft palate elevation  Normal   IX,X: gag reflex    XI: trapezius strength  5/5   XI: sternocleidomastoid strength 5/5   XI: neck extension strength  5/5   XII: tongue strength  Normal     Motor:  5/5 throughout--except 3/5 BLE  Normal bulk and tone  No drift or abnormal movements    Sensory:  LT normal in all limbs    Coordination:   Mild ataxia with FN b/l--improves with repetition  FFM and JEAN slightly slower on R  Difficulty with HS testing d/t chronic leg weakness    DTR:   1+ throughout    No Babinski  No Mirza's     Bilateral palmar grasp  No suck reflex appreciated    Laboratory/Radiology:     CBC with Differential:    Lab Results   Component Value Date    WBC 15.6 08/24/2021    RBC 4.87 08/24/2021    HGB 15.0 08/24/2021    HCT 42.9 08/24/2021     08/24/2021    MCV 88.1 08/24/2021    MCH 30.8 08/24/2021    MCHC 35.0 08/24/2021    RDW 13.3 08/24/2021    SEGSPCT 85 11/07/2012    LYMPHOPCT 7.4 08/24/2021    MONOPCT 5.4 08/24/2021    BASOPCT 0.1 08/24/2021    MONOSABS 0.85 08/24/2021    LYMPHSABS 1.15 08/24/2021    EOSABS 0.00 08/24/2021    BASOSABS 0.01 08/24/2021     CMP:    Lab Results   Component Value Date     08/24/2021    K 3.5 08/24/2021    K 3.7 08/20/2021     08/24/2021    CO2 24 08/24/2021    BUN 32 08/24/2021    CREATININE 0.7 08/24/2021    GFRAA >60 08/24/2021    LABGLOM >60 08/24/2021    GLUCOSE 207 08/24/2021    GLUCOSE 91 03/10/2011    PROT 7.0 08/24/2021    LABALBU 3.9 08/24/2021    LABALBU 4.7 03/10/2011    CALCIUM 9.3 08/24/2021    BILITOT 0.4 08/24/2021    ALKPHOS 117 08/24/2021    AST 27 08/24/2021    ALT 32 08/24/2021     MRI Brain:  1. No acute intracranial abnormality. 2. No mass, mass effect, edema or hemorrhage. EEG: Normal      Ref.  Range 8/22/2021 12:10   Appearance, CSF Latest Ref Range: Clear  Clear   CSF Culture Unknown Growth not present, incubation continues   Glucose, CSF Latest Ref Range: 40 - 70 mg/dL 75 (H)   OLIGOCLONAL BANDING Unknown Rpt   Protein, CSF Latest Ref Range: 15 - 40 mg/dL 22   RBC, CSF Latest Units: /uL <2000   WBC, CSF Latest Ref Range: 0 - 2 /uL <3   Neutrophils, CSF Latest Ref Range: 0 - 10 % 50 (H)   Monocytes, CSF Latest Ref Range: 10 - 70 % 50   Color, CSF Unknown Colorless   Tube Number + CELL CT + DIFF-CSF Unknown Tube 2     Autoimmune enceph panel pending    Lyme neg    Final CSF studies pending    B1 pending    I personally reviewed the patient's lab and imaging studies at this time. Assessment:     Altered mental status: unclear etiology. Concern for Lewy body dementia remains high, particularly with his father's early diagnosis. Thus far neurologic work-up has been unrevealing, but autoimmune encephalitis must also remain on the differential-- as CSF studies can appear somewhat normal with this condition. However, he has had no improvement after 3 days of high dose IV steroids, and will stop this medication. Unfortunately, CJD labs were not able to be added to CSF but there is low suspicion for this condition at this time.      Muscular dystrophy    Plan:     Transfer to [facility] pending    Stop steroids    F/U final CSF and serum studies     Consider repeating MRI brain [facility] FOR COGNITIVE DISORDERS    Consider PET scan--per Dr. Alba Wilcox, this should be per [facility] discretion    Discussed with patient's wife    Will follow    OSEAS Self CNP  9:20 AM  8/25/2021

## 2021-08-26 LAB
CERULOPLASMIN: 25 MG/DL (ref 15–30)
CSF CULTURE: NORMAL
GRAM STAIN RESULT: NORMAL
HIV-1 AND HIV-2 ANTIBODIES: NORMAL
RPR: NORMAL
THYROGLOBULIN ANTIBODY: <12 IU/ML (ref 0–40)

## 2021-08-26 PROCEDURE — 1200000000 HC SEMI PRIVATE

## 2021-08-26 PROCEDURE — 6370000000 HC RX 637 (ALT 250 FOR IP): Performed by: INTERNAL MEDICINE

## 2021-08-26 PROCEDURE — 99233 SBSQ HOSP IP/OBS HIGH 50: CPT | Performed by: NURSE PRACTITIONER

## 2021-08-26 PROCEDURE — 6370000000 HC RX 637 (ALT 250 FOR IP): Performed by: FAMILY MEDICINE

## 2021-08-26 PROCEDURE — 6370000000 HC RX 637 (ALT 250 FOR IP): Performed by: PSYCHIATRY & NEUROLOGY

## 2021-08-26 RX ADMIN — ASPIRIN 81 MG CHEWABLE TABLET 81 MG: 81 TABLET CHEWABLE at 10:05

## 2021-08-26 RX ADMIN — ATORVASTATIN CALCIUM 40 MG: 40 TABLET, FILM COATED ORAL at 19:55

## 2021-08-26 RX ADMIN — QUETIAPINE FUMARATE 25 MG: 25 TABLET ORAL at 19:55

## 2021-08-26 RX ADMIN — Medication 1000 MCG: at 10:04

## 2021-08-26 RX ADMIN — FLUCONAZOLE 800 MG: 100 TABLET ORAL at 10:04

## 2021-08-26 RX ADMIN — AMLODIPINE BESYLATE 5 MG: 5 TABLET ORAL at 10:04

## 2021-08-26 RX ADMIN — TRAMADOL HYDROCHLORIDE 50 MG: 50 TABLET, FILM COATED ORAL at 19:55

## 2021-08-26 ASSESSMENT — PAIN SCALES - GENERAL
PAINLEVEL_OUTOF10: 0
PAINLEVEL_OUTOF10: 0
PAINLEVEL_OUTOF10: 7
PAINLEVEL_OUTOF10: 0
PAINLEVEL_OUTOF10: 0

## 2021-08-26 ASSESSMENT — PAIN DESCRIPTION - DESCRIPTORS: DESCRIPTORS: ACHING;CONSTANT;DISCOMFORT

## 2021-08-26 ASSESSMENT — PAIN DESCRIPTION - ONSET: ONSET: ON-GOING

## 2021-08-26 ASSESSMENT — PAIN DESCRIPTION - LOCATION: LOCATION: LEG

## 2021-08-26 ASSESSMENT — PAIN DESCRIPTION - ORIENTATION: ORIENTATION: RIGHT;LEFT

## 2021-08-26 ASSESSMENT — PAIN DESCRIPTION - FREQUENCY: FREQUENCY: CONTINUOUS

## 2021-08-26 ASSESSMENT — PAIN DESCRIPTION - PAIN TYPE: TYPE: CHRONIC PAIN

## 2021-08-26 NOTE — PROGRESS NOTES
Department of Internal Medicine  Infectious Diseases  Progress  Note      C/C :    Encephalopathy ?  Yeast in the CSF     Pt is awake and alert  Confused   Afebrile     Current Facility-Administered Medications   Medication Dose Route Frequency Provider Last Rate Last Admin    fluconazole (DIFLUCAN) tablet 800 mg  800 mg Oral Daily Dylon Quispe MD   800 mg at 08/26/21 1004    vitamin B-12 (CYANOCOBALAMIN) tablet 1,000 mcg  1,000 mcg Oral Daily Valdez Mort, DO   1,000 mcg at 08/26/21 1004    sodium chloride flush 0.9 % injection 5-40 mL  5-40 mL IntraVENous 2 times per day Valdez Mort, DO   10 mL at 08/25/21 2021    sodium chloride flush 0.9 % injection 5-40 mL  5-40 mL IntraVENous PRN Valdez Mort, DO        0.9 % sodium chloride infusion  25 mL IntraVENous PRN Valdez Mort, DO        QUEtiapine (SEROQUEL) tablet 25 mg  25 mg Oral BID PRN Isablea Blackburn MD   25 mg at 08/25/21 2104    amLODIPine (NORVASC) tablet 5 mg  5 mg Oral Daily [facility], APRN - CNP   5 mg at 08/26/21 1004    traMADol (ULTRAM) tablet 50 mg  50 mg Oral Q6H PRN [facility], APRN - CNP   50 mg at 08/25/21 2104    sodium chloride flush 0.9 % injection 5-40 mL  5-40 mL IntraVENous 2 times per day [facility], APRN - CNP   10 mL at 08/25/21 1011    sodium chloride flush 0.9 % injection 10 mL  10 mL IntraVENous PRN [facility], APRN - CNP        0.9 % sodium chloride infusion  25 mL IntraVENous PRN [facility], APRN - CNP        ondansetron (ZOFRAN-ODT) disintegrating tablet 4 mg  4 mg Oral Q8H PRN [facility], APRN - CNP        Or    ondansetron (ZOFRAN) injection 4 mg  4 mg IntraVENous Q6H PRN [facility], APRN - CNP        acetaminophen (TYLENOL) tablet 650 mg  650 mg Oral Q6H PRN [facility], APRN - CNP        Or    acetaminophen (TYLENOL) suppository 650 mg  650 mg Rectal Q6H PRN [facility], APRN - CNP        magnesium hydroxide (MILK OF MAGNESIA) 400 MG/5ML suspension 30 mL  30 mL Oral Daily PRN Theora Runner Learn, APRN - CNP        aspirin chewable tablet 81 mg  81 mg Oral Daily Theora Runner Learn, APRN - CNP   81 mg at 08/26/21 1005    enoxaparin (LOVENOX) injection 40 mg  40 mg Subcutaneous Daily Theora Runner Learn, APRN - CNP   40 mg at 08/25/21 0856    atorvastatin (LIPITOR) tablet 40 mg  40 mg Oral Nightly Theora Runner Learn, APRN - CNP   40 mg at 08/25/21 2104    potassium chloride (KLOR-CON M) extended release tablet 40 mEq  40 mEq Oral PRN Theora Runner Learn, APRN - CNP        Or    potassium bicarb-citric acid (EFFER-K) effervescent tablet 40 mEq  40 mEq Oral PRN Theora Runner Learn, APRN - CNP   40 mEq at 08/21/21 9178    Or    potassium chloride 10 mEq/100 mL IVPB (Peripheral Line)  10 mEq IntraVENous PRN Theora Runner Learn, APRN - CNP        nitroGLYCERIN (NITROSTAT) SL tablet 0.4 mg  0.4 mg Sublingual Q5 Min PRN Theora Runner Learn, APRN - CNP             REVIEW OF SYSTEMS:      CONSTITUTIONAL:  Denies fever, chill or rigors  HEENT: Denies headache, neck pain , photophobia   RESPIRATORY: denies cough, shortness of breath, sputum expectoration, chest pain. CARDIOVASCULAR:  Denies palpitation  GASTROINTESTINAL:  Denies abdomen pain, diarrhea or constipation. GENITOURINARY:  Denies burning urination or frequency of urination  INTEGUMENT: denies wound , rash  HEMATOLOGIC/LYMPHATIC:  Denies lymph node swelling, gum bleeding or easy bruising. MUSCULOSKELETAL:  Denies leg pain , joint pain , joint swelling  NEUROLOGICAL:  Change in mental status     PHYSICAL EXAM:      Vitals:     Vitals:    08/26/21 0810   BP: (!) 143/73   Pulse: 67   Resp: 18   Temp: 97.7 °F (36.5 °C)   SpO2: 96%       General Appearance:    Awake, alert , no acute distress. Head:    Normocephalic, atraumatic   Eyes:    No pallor, no icterus,   Ears:    No obvious deformity or drainage.    Nose:   No nasal drainage   Throat:   Mucosa moist, no oral thrush   Neck:   Supple, no lymphadenopathy Back:     no CVA tenderness   Lungs:     Clear to auscultation bilaterally, no wheeze    Heart:    Regular rate and rhythm, no murmur, rub or gallop   Abdomen:     Soft, non-tender, bowel sounds present    Extremities:   No edema, no cyanosis   Pulses:   Dorsalis pedis palpable    Skin:   no rashes or lesions     CBC with Differential:      Lab Results   Component Value Date    WBC 15.6 08/24/2021    RBC 4.87 08/24/2021    HGB 15.0 08/24/2021    HCT 42.9 08/24/2021     08/24/2021    MCV 88.1 08/24/2021    MCH 30.8 08/24/2021    MCHC 35.0 08/24/2021    RDW 13.3 08/24/2021    SEGSPCT 85 11/07/2012    LYMPHOPCT 7.4 08/24/2021    MONOPCT 5.4 08/24/2021    BASOPCT 0.1 08/24/2021    MONOSABS 0.85 08/24/2021    LYMPHSABS 1.15 08/24/2021    EOSABS 0.00 08/24/2021    BASOSABS 0.01 08/24/2021       CMP     Lab Results   Component Value Date     08/24/2021    K 3.5 08/24/2021    K 3.7 08/20/2021     08/24/2021    CO2 24 08/24/2021    BUN 32 08/24/2021    CREATININE 0.7 08/24/2021    GFRAA >60 08/24/2021    LABGLOM >60 08/24/2021    GLUCOSE 207 08/24/2021    GLUCOSE 91 03/10/2011    PROT 7.0 08/24/2021    LABALBU 3.9 08/24/2021    LABALBU 4.7 03/10/2011    CALCIUM 9.3 08/24/2021    BILITOT 0.4 08/24/2021    ALKPHOS 117 08/24/2021    AST 27 08/24/2021    ALT 32 08/24/2021         Hepatic Function Panel:    Lab Results   Component Value Date    ALKPHOS 117 08/24/2021    ALT 32 08/24/2021    AST 27 08/24/2021    PROT 7.0 08/24/2021    BILITOT 0.4 08/24/2021    BILIDIR 0.1 11/07/2012    LABALBU 3.9 08/24/2021    LABALBU 4.7 03/10/2011       PT/INR:    Lab Results   Component Value Date    PROTIME 11.6 08/22/2021    PROTIME 11.3 11/02/2010    INR 1.1 08/22/2021       TSH:    Lab Results   Component Value Date    TSH 1.270 08/20/2021       U/A:    Lab Results   Component Value Date    COLORU Yellow 08/19/2021    PHUR 8.0 08/19/2021    WBCUA 1-3 08/19/2021    WBCUA 0-1 03/10/2011    RBCUA NONE 08/19/2021    RBCUA 10-20 11/07/2012    BACTERIA RARE 08/19/2021    CLARITYU Clear 08/19/2021    SPECGRAV 1.015 08/19/2021    LEUKOCYTESUR TRACE 08/19/2021    UROBILINOGEN 2.0 08/19/2021    BILIRUBINUR Negative 08/19/2021    BILIRUBINUR NEGATIVE 03/10/2011    BLOODU Negative 08/19/2021    GLUCOSEU Negative 08/19/2021    GLUCOSEU NEGATIVE 03/10/2011    AMORPHOUS MANY 11/07/2012       ABG:  No results found for: RFK2YEY, BEART, T4QYXTWJ, PHART, THGBART, FML0FNB, PO2ART, ZLW9RGZ    MICROBIOLOGY:    CSF - cytology     DIAGNOSIS   NEGATIVE FOR MALIGNANT CELLS     Rare budding yeast forms in association with anucleate squames in an   otherwise acellular specimen, see comment. CSF cx neg to date   CSF - encephalitis panel neg       Radiology :    MRI brain -  Impression:        1. No acute intracranial abnormality. 2. No mass, mass effect, edema or hemorrhage. IMPRESSION:     1. Acute confusional state - CSF unremarkable , but cytology revealed budding yeast with acellular specimen. 2. Leukocytosis     RECOMMENDATIONS:      1. Diflucan 800 mg po q 24 hrs, await CSF crypto antigen test   2.  CBC with diff

## 2021-08-26 NOTE — PROGRESS NOTES
Frannie Herrera is a 64 y.o. left handed male     Neuro is following for an altered mental status    PMH: HTN, muscular dystrophy    Patient was admitted with increased confusion--over the past month he has become more focused on killing people and dying himself. Previously followed with  in 2015 for CMT--who recommended muscle biopsy given his elevated CKs and EMG findings in 2015. He has never undergone any genetic testing, and never followed through with muscle biopsy testing. +family hx of Alzheimer's dementia in his father at a young age (62). Has property with [place] and goes for walks at time--no known tick bite    CSF cytology showed rare budding yeast forms but was otherwise acellular. ID was consulted and now on antifungal therapy with CSF crypto-antigen still pending. Steroids were stopped yesterday due to no improvement after three days.     No family at the bedside    Current Facility-Administered Medications   Medication Dose Route Frequency Provider Last Rate Last Admin    fluconazole (DIFLUCAN) tablet 800 mg  800 mg Oral Daily Dylon Quispe MD   800 mg at 08/25/21 1739    vitamin B-12 (CYANOCOBALAMIN) tablet 1,000 mcg  1,000 mcg Oral Daily Cesar Motta, DO   1,000 mcg at 08/25/21 0856    sodium chloride flush 0.9 % injection 5-40 mL  5-40 mL IntraVENous 2 times per day Cesar Muñozo, DO   10 mL at 08/25/21 2021    sodium chloride flush 0.9 % injection 5-40 mL  5-40 mL IntraVENous PRN Cesar Muñozo, DO        0.9 % sodium chloride infusion  25 mL IntraVENous PRN Cesar Muñozo, DO        QUEtiapine (SEROQUEL) tablet 25 mg  25 mg Oral BID PRN Josr Shaw MD   25 mg at 08/25/21 2104    amLODIPine (NORVASC) tablet 5 mg  5 mg Oral Daily NayaOSEAS Perry - CNP   5 mg at 08/25/21 0856    traMADol (ULTRAM) tablet 50 mg  50 mg Oral Q6H PRN OSEAS Peng - CNP   50 mg at 08/25/21 2104    sodium chloride flush 0.9 % injection 5-40 mL  5-40 mL IntraVENous 2 times per day [facility], APRN - CNP   10 mL at 08/25/21 1011    sodium chloride flush 0.9 % injection 10 mL  10 mL IntraVENous PRN [facility], APRN - CNP        0.9 % sodium chloride infusion  25 mL IntraVENous PRN [facility], APRN - CNP        ondansetron (ZOFRAN-ODT) disintegrating tablet 4 mg  4 mg Oral Q8H PRN [facility], APRN - CNP        Or    on[facility]etron [facility]) injection 4 mg  4 mg IntraVENous Q6H PRN [facility], APRN - CNP        acetaminophen (TYLENOL) tablet 650 mg  650 mg Oral Q6H PRN [facility], APRN - CNP        Or    acetaminophen (TYLENOL) suppository 650 mg  650 mg Rectal Q6H PRN [facility], APRN - CNP        magnesium hydroxide (MILK OF MAGNESIA) 400 MG/5ML suspension 30 mL  30 mL Oral Daily PRN [facility], APRN - CNP        aspirin chewable tablet 81 mg  81 mg Oral Daily [facility], APRN - CNP   81 mg at 08/24/21 7891    enoxaparin (LOVENOX) injection 40 mg  40 mg Subcutaneous Daily [facility], APRN - CNP   40 mg at 08/25/21 0856    atorvastatin (LIPITOR) tablet 40 mg  40 mg Oral Nightly [facility], APRN - CNP   40 mg at 08/25/21 2104    potassium chloride (KLOR-CON M) extended release tablet 40 mEq  40 mEq Oral PRN [facility], APRN - CNP        Or    potassium bicarb-citric acid (EFFER-K) effervescent tablet 40 mEq  40 mEq Oral PRN [facility], APRN - CNP   40 mEq at 08/21/21 6581    Or    potassium chloride 10 mEq/100 mL IVPB (Peripheral Line)  10 mEq IntraVENous PRN [facility] Learn, APRN - CNP        nitroGLYCERIN (NITROSTAT) SL tablet 0.4 mg  0.4 mg Sublingual Q5 Min PRN [facility], APRN - CNP         Objective:     BP (!) 143/73   Pulse 67   Temp 97.7 °F (36.5 °C) (Temporal)   Resp 18   Ht 5' 11\" (1.803 m)   Wt 282 lb 3 oz (128 kg)   SpO2 96%   BMI 39.36 kg/m²      General appearance: Awake, cooperative, lying in bed in no distress  Head: Normocephalic, without obvious abnormality, atraumatic  Eyes: sclera clear  Neck: full ROM; no Lhermitte's  Heart: RRR  Lungs: clear throughout  Extremities: no cyanosis or edema  Pulses: 2+ and symmetric  Skin: no rashes or lesions    Mental Status: Awake, oriented to person, date, knows he is in the hospital.    Tearful and emotional today  Fair attention/concentration  Follows all simple and two step commands fairly well    Speech: Mildly dysarthric  Language: talks nonsensically at times with inappropriate sexual references    Cranial Nerves:  I: smell    II: visual acuity     II: visual fields Full   II: pupils JOSE   III,VII: ptosis None   III,IV,VI: extraocular muscles  EOMI without nystagmus    V: mastication Normal   V: facial light touch sensation  Normal   V,VII: corneal reflex     VII: facial muscle function - upper     VII: facial muscle function - lower Normal   VIII: hearing Normal   IX: soft palate elevation  Normal   IX,X: gag reflex    XI: trapezius strength  5/5   XI: sternocleidomastoid strength 5/5   XI: neck extension strength  5/5   XII: tongue strength  Normal     Motor:  5/5 throughout--except 3/5 BLE  Normal bulk and tone  No drift or abnormal movements    Sensory:  LT normal in all limbs    Coordination:   Mild ataxia with FN b/l--improves with repetition  FFM and JEAN slightly slower on R    DTR:   1+ throughout    No Babinski  No Mirza's     Bilateral palmar grasp  No suck reflex appreciated    Laboratory/Radiology:     CBC with Differential:    Lab Results   Component Value Date    WBC 15.6 08/24/2021    RBC 4.87 08/24/2021    HGB 15.0 08/24/2021    HCT 42.9 08/24/2021     08/24/2021    MCV 88.1 08/24/2021    MCH 30.8 08/24/2021    MCHC 35.0 08/24/2021    RDW 13.3 08/24/2021    SEGSPCT 85 11/07/2012    LYMPHOPCT 7.4 08/24/2021    MONOPCT 5.4 08/24/2021    BASOPCT 0.1 08/24/2021    MONOSABS 0.85 08/24/2021    LYMPHSABS 1.15 08/24/2021    EOSABS 0.00 08/24/2021    BASOSABS 0.01 08/24/2021     CMP:    Lab Results   Component Value Date     08/24/2021    K 3.5 08/24/2021    K 3.7 08/20/2021     08/24/2021    CO2 24 08/24/2021    BUN 32 08/24/2021    CREATININE 0.7 08/24/2021    GFRAA >60 08/24/2021    LABGLOM >60 08/24/2021    GLUCOSE 207 08/24/2021    GLUCOSE 91 03/10/2011    PROT 7.0 08/24/2021    LABALBU 3.9 08/24/2021    LABALBU 4.7 03/10/2011    CALCIUM 9.3 08/24/2021    BILITOT 0.4 08/24/2021    ALKPHOS 117 08/24/2021    AST 27 08/24/2021    ALT 32 08/24/2021     MRI Brain:  1. No acute intracranial abnormality. 2. No mass, mass effect, edema or hemorrhage. EEG: Normal      Ref. Range 8/22/2021 12:10   Appearance, CSF Latest Ref Range: Clear  Clear   CSF Culture Unknown Growth not present, incubation continues   Glucose, CSF Latest Ref Range: 40 - 70 mg/dL 75 (H)   OLIGOCLONAL BANDING Unknown Rpt   Protein, CSF Latest Ref Range: 15 - 40 mg/dL 22   RBC, CSF Latest Units: /uL <2000   WBC, CSF Latest Ref Range: 0 - 2 /uL <3   Neutrophils, CSF Latest Ref Range: 0 - 10 % 50 (H)   Monocytes, CSF Latest Ref Range: 10 - 70 % 50   Color, CSF Unknown Colorless   Tube Number + CELL CT + DIFF-CSF Unknown Tube 2     CSF cytology: Rare budding yeast forms in association with anucleate squames in an   otherwise acellular specimen, see comment. COMMENT:    Although anucleate squames are seen most likely consistent with skin   contamination, the budding yeast forms are not typical of candida   species. Cryptococcal organisms cannot be excluded. Correlation with   cryptococcal serologic and microbiologic studies is essential.   Intradepartmental consultation is obtained. CSF cryptococcal antigen pending    Autoimmune enceph panel pending    I personally reviewed the patient's lab and imaging studies at this time. Assessment:     Altered mental status: unclear etiology.  Budding yeast forms seen in CSF with an otherwise acellular specimen-which is atypical. Now on antifungal, with ID workup for cryptococcus pending.  If negative, must still consider Lewy Body Dementia--and final CSF studies pending for autoimmune encephalitis    Muscular dystrophy    Plan:     ID now following--await cryptococcal workup    Follow up final CSF studies    Transfer to [facility] pending    Updated wife via phone and discussed with Dr. Ivett Torres    Will follow    Colton Godinez, APRN - CNP  9:29 AM  8/26/2021

## 2021-08-26 NOTE — PROGRESS NOTES
Nutrition Assessment     Type and Reason for Visit: Initial, RD Nutrition Re-Screen/LOS (RD Re-Screen Negative)    Nutrition Assessment:  Pt assessed per LOS protocol. Chart reviewed. Pt currently eating ~75% of most meals (sporadic intake noted at times) and w/ no other significant nutritional issues noted at this time. Will Start ONS to aid in sporadic intake and will follow-up per policy. Please consult if RD needed or if pt w/ consistent poor intakes.     Electronically signed by Don Pitt RD, LD on 8/26/21 at 1:16 PM EDT    Contact: ext [phone]

## 2021-08-26 NOTE — PLAN OF CARE
Problem: Falls - Risk of:  Goal: Will remain free from falls  Description: Will remain free from falls  Outcome: Met This Shift     Problem: Falls - Risk of:  Goal: Absence of physical injury  Description: Absence of physical injury  Outcome: Met This Shift     Problem: Skin Integrity:  Goal: Absence of new skin breakdown  Description: Absence of new skin breakdown  Outcome: Met This Shift     Problem: Pain:  Goal: Pain level will decrease  Description: Pain level will decrease  Outcome: Met This Shift     Problem: Mental Status - Impaired:  Goal: Mental status will improve  Description: Mental status will improve  Outcome: Not Met This Shift

## 2021-08-26 NOTE — PROGRESS NOTES
Chief Complaint:  Chief Complaint   Patient presents with    Altered Mental Status     patient DX with MD, per EMS family reported that he's been acting different, was driving car and told wife that he was going to kill them     Altered mental status     Subjective:    He remains totally confused and rambling. Somewhat pressured speech. Agitated and yelling at times. No improvement compared to yesterday. Objective:    /78   Pulse 80   Temp 96.5 °F (35.8 °C) (Temporal)   Resp 18   Ht 5' 11\" (1.803 m)   Wt 282 lb 3 oz (128 kg)   SpO2 93%   BMI 39.36 kg/m²     Current medications that patient is taking have been reviewed. General appearance: NAD, conversant  HEENT: AT/NC, MMM  Neck: FROM, supple  Lungs: Clear to auscultation, WOB normal  CV: RRR, no MRGs  Abdomen: Soft, non-tender; no masses or HSM, +BS  Extremities: No peripheral edema or digital cyanosis  Skin: no rash, lesions or ulcers  Psych: Calm and cooperative at the moment. Pressured speech. Rambling on tangents. Most responses incongruous to the content of the conversation. Neuro: Alert and interactive, face symmetric, moving all extremities, speech fluent.     Labs:  CBC with Differential:    Lab Results   Component Value Date    WBC 15.6 08/24/2021    RBC 4.87 08/24/2021    HGB 15.0 08/24/2021    HCT 42.9 08/24/2021     08/24/2021    MCV 88.1 08/24/2021    MCH 30.8 08/24/2021    MCHC 35.0 08/24/2021    RDW 13.3 08/24/2021    SEGSPCT 85 11/07/2012    LYMPHOPCT 7.4 08/24/2021    MONOPCT 5.4 08/24/2021    BASOPCT 0.1 08/24/2021    MONOSABS 0.85 08/24/2021    LYMPHSABS 1.15 08/24/2021    EOSABS 0.00 08/24/2021    BASOSABS 0.01 08/24/2021     CMP:    Lab Results   Component Value Date     08/24/2021    K 3.5 08/24/2021    K 3.7 08/20/2021     08/24/2021    CO2 24 08/24/2021    BUN 32 08/24/2021    CREATININE 0.7 08/24/2021    GFRAA >60 08/24/2021    LABGLOM >60 08/24/2021    GLUCOSE 207 08/24/2021    GLUCOSE 91 03/10/2011    PROT 7.0 08/24/2021    LABALBU 3.9 08/24/2021    LABALBU 4.7 03/10/2011    CALCIUM 9.3 08/24/2021    BILITOT 0.4 08/24/2021    ALKPHOS 117 08/24/2021    AST 27 08/24/2021    ALT 32 08/24/2021          Assessment/Plan:  Principal Problem:    Altered mental status  Active Problems:    Elevated troponin    MD (muscular dystrophy) ([facility]    Hypertension    Severe obesity (BMI 35.0-35.9 with comorbidity) ([facility]    Encounter for psychiatric assessment  Resolved Problems:    * No resolved hospital problems. *       Remains totally confused. Puzzling case. Repeat MRI to see if any evolution of findings - possible first MR was too soon.     Finished IV steroids    On diflucan per ID    Accepted to [facility], awaiting bed    Requires continued inpatient level of care     Ricka Libman, MD    8:01 AM  8/26/2021

## 2021-08-26 NOTE — CARE COORDINATION
Discharge plan is transfer to [facility]. Bed is not available at this time. Envelope and ambulance form in soft chart.  Rosalie Favre -[phone]

## 2021-08-27 LAB
ALBUMIN CSF: 13 MG/DL (ref 0–35)
ALBUMIN INDEX: 3.4 RATIO (ref 0–9)
ALBUMIN SERPL-MCNC: 3.6 G/DL (ref 3.5–5.2)
ALBUMIN, SERUM BY NEPHELOMETRY: 3801 MG/DL (ref 3500–5200)
ALP BLD-CCNC: 100 U/L (ref 40–129)
ALT SERPL-CCNC: 40 U/L (ref 0–40)
ANION GAP SERPL CALCULATED.3IONS-SCNC: 8 MMOL/L (ref 7–16)
AST SERPL-CCNC: 26 U/L (ref 0–39)
BASOPHILS ABSOLUTE: 0.01 E9/L (ref 0–0.2)
BASOPHILS RELATIVE PERCENT: 0.1 % (ref 0–2)
BILIRUB SERPL-MCNC: 0.4 MG/DL (ref 0–1.2)
BUN BLDV-MCNC: 34 MG/DL (ref 6–23)
CALCIUM SERPL-MCNC: 8.7 MG/DL (ref 8.6–10.2)
CHLORIDE BLD-SCNC: 102 MMOL/L (ref 98–107)
CO2: 28 MMOL/L (ref 22–29)
CREAT SERPL-MCNC: 0.7 MG/DL (ref 0.7–1.2)
EOSINOPHILS ABSOLUTE: 0 E9/L (ref 0.05–0.5)
EOSINOPHILS RELATIVE PERCENT: 0 % (ref 0–6)
GFR AFRICAN AMERICAN: >60
GFR NON-AFRICAN AMERICAN: >60 ML/MIN/1.73
GLUCOSE BLD-MCNC: 134 MG/DL (ref 74–99)
HCT VFR BLD CALC: 43.9 % (ref 37–54)
HEMOGLOBIN: 15 G/DL (ref 12.5–16.5)
IGG CSF: 1.6 MG/DL (ref 0–6)
IGG INDEX: 0.45 RATIO (ref 0.28–0.66)
IGG SYNTHESIS RATE CSF: <0 MG/D
IGG/ALBUMIN CSF: 0.12 RATIO (ref 0.09–0.25)
IGG: 1039 MG/DL (ref 768–1632)
IMMATURE GRANULOCYTES #: 0.11 E9/L
IMMATURE GRANULOCYTES %: 1 % (ref 0–5)
LYMPHOCYTES ABSOLUTE: 1.14 E9/L (ref 1.5–4)
LYMPHOCYTES RELATIVE PERCENT: 10.7 % (ref 20–42)
Lab: NORMAL
MCH RBC QN AUTO: 30.5 PG (ref 26–35)
MCHC RBC AUTO-ENTMCNC: 34.2 % (ref 32–34.5)
MCV RBC AUTO: 89.2 FL (ref 80–99.9)
MONOCYTES ABSOLUTE: 0.86 E9/L (ref 0.1–0.95)
MONOCYTES RELATIVE PERCENT: 8.1 % (ref 2–12)
MULTIPLE SCLEROSIS PANEL: NORMAL
NEUTROPHILS ABSOLUTE: 8.56 E9/L (ref 1.8–7.3)
NEUTROPHILS RELATIVE PERCENT: 80.1 % (ref 43–80)
OLIGOCLONAL BANDS CSF: NEGATIVE
OLIGOCLONAL BANDS NUMBER: 0 BANDS (ref 0–1)
PDW BLD-RTO: 13.3 FL (ref 11.5–15)
PLATELET # BLD: 176 E9/L (ref 130–450)
PMV BLD AUTO: 10.2 FL (ref 7–12)
POTASSIUM SERPL-SCNC: 3.6 MMOL/L (ref 3.5–5)
RBC # BLD: 4.92 E12/L (ref 3.8–5.8)
REPORT: NORMAL
SODIUM BLD-SCNC: 138 MMOL/L (ref 132–146)
THIS TEST SENT TO: NORMAL
TOTAL PROTEIN: 6.2 G/DL (ref 6.4–8.3)
WBC # BLD: 10.7 E9/L (ref 4.5–11.5)

## 2021-08-27 PROCEDURE — 6370000000 HC RX 637 (ALT 250 FOR IP): Performed by: FAMILY MEDICINE

## 2021-08-27 PROCEDURE — 6370000000 HC RX 637 (ALT 250 FOR IP): Performed by: PSYCHIATRY & NEUROLOGY

## 2021-08-27 PROCEDURE — 1200000000 HC SEMI PRIVATE

## 2021-08-27 PROCEDURE — 85025 COMPLETE CBC W/AUTO DIFF WBC: CPT

## 2021-08-27 PROCEDURE — 6370000000 HC RX 637 (ALT 250 FOR IP): Performed by: INTERNAL MEDICINE

## 2021-08-27 PROCEDURE — 80053 COMPREHEN METABOLIC PANEL: CPT

## 2021-08-27 PROCEDURE — 36415 COLL VENOUS BLD VENIPUNCTURE: CPT

## 2021-08-27 PROCEDURE — 6360000002 HC RX W HCPCS: Performed by: FAMILY MEDICINE

## 2021-08-27 RX ORDER — CALCIUM CARBONATE 200(500)MG
500 TABLET,CHEWABLE ORAL 3 TIMES DAILY PRN
Status: DISCONTINUED | OUTPATIENT
Start: 2021-08-27 | End: 2021-08-29 | Stop reason: HOSPADM

## 2021-08-27 RX ADMIN — ATORVASTATIN CALCIUM 40 MG: 40 TABLET, FILM COATED ORAL at 21:25

## 2021-08-27 RX ADMIN — Medication 1000 MCG: at 08:55

## 2021-08-27 RX ADMIN — ENOXAPARIN SODIUM 40 MG: 40 INJECTION SUBCUTANEOUS at 08:55

## 2021-08-27 RX ADMIN — AMLODIPINE BESYLATE 5 MG: 5 TABLET ORAL at 08:55

## 2021-08-27 RX ADMIN — FLUCONAZOLE 800 MG: 100 TABLET ORAL at 08:55

## 2021-08-27 RX ADMIN — ASPIRIN 81 MG CHEWABLE TABLET 81 MG: 81 TABLET CHEWABLE at 08:55

## 2021-08-27 ASSESSMENT — PAIN SCALES - GENERAL
PAINLEVEL_OUTOF10: 0

## 2021-08-27 NOTE — PROGRESS NOTES
Patient has FOI, lability, and is observed frequently talking to unseen others. He will look to the side and yell \"Shut up! \" He will then turn to me and state \"Sorry, I'm not talking to you. \" Therapeutic presence and emotional support provided in all interactions.     Electronically signed by Wojciech Barnes RN on 8/27/2021 at 9:29 AM

## 2021-08-27 NOTE — PROGRESS NOTES
Chief Complaint:  Chief Complaint   Patient presents with    Altered Mental Status     patient DX with MD, per EMS family reported that he's been acting different, was driving car and told wife that he was going to kill them     Altered mental status     Subjective:    He remains totally confused and rambling. Apparently hypersexual today per RN. Talking to unseen other in the room when I came in to see him. Objective:    /62   Pulse 87   Temp 97.8 °F (36.6 °C) (Temporal)   Resp 18   Ht 5' 11\" (1.803 m)   Wt 285 lb 11.2 oz (129.6 kg)   SpO2 94%   BMI 39.85 kg/m²     Current medications that patient is taking have been reviewed. General appearance: NAD, conversant  HEENT: AT/NC, MMM  Neck: FROM, supple  Lungs: Clear to auscultation, WOB normal  CV: RRR, no MRGs  Abdomen: Soft, non-tender; no masses or HSM, +BS  Extremities: No peripheral edema or digital cyanosis  Skin: no rash, lesions or ulcers  Psych: Calm and cooperative at the moment. Pressured speech. Rambling on tangents. Most responses incongruous to the content of the conversation. Neuro: Alert and interactive, face symmetric, moving all extremities, speech fluent.     Labs:  CBC with Differential:    Lab Results   Component Value Date    WBC 10.7 08/27/2021    RBC 4.92 08/27/2021    HGB 15.0 08/27/2021    HCT 43.9 08/27/2021     08/27/2021    MCV 89.2 08/27/2021    MCH 30.5 08/27/2021    MCHC 34.2 08/27/2021    RDW 13.3 08/27/2021    SEGSPCT 85 11/07/2012    LYMPHOPCT 10.7 08/27/2021    MONOPCT 8.1 08/27/2021    BASOPCT 0.1 08/27/2021    MONOSABS 0.86 08/27/2021    LYMPHSABS 1.14 08/27/2021    EOSABS 0.00 08/27/2021    BASOSABS 0.01 08/27/2021     CMP:    Lab Results   Component Value Date     08/27/2021    K 3.6 08/27/2021    K 3.7 08/20/2021     08/27/2021    CO2 28 08/27/2021    BUN 34 08/27/2021    CREATININE 0.7 08/27/2021    GFRAA >60 08/27/2021    LABGLOM >60 08/27/2021    GLUCOSE 134 08/27/2021    GLUCOSE 91 03/10/2011    PROT 6.2 08/27/2021    LABALBU 3.6 08/27/2021    LABALBU 4.7 03/10/2011    CALCIUM 8.7 08/27/2021    BILITOT 0.4 08/27/2021    ALKPHOS 100 08/27/2021    AST 26 08/27/2021    ALT 40 08/27/2021          Assessment/Plan:  Principal Problem:    Altered mental status  Active Problems:    Elevated troponin    MD (muscular dystrophy) ([facility]    Hypertension    Severe obesity (BMI 35.0-35.9 with comorbidity) ([facility]    Encounter for psychiatric assessment  Resolved Problems:    * No resolved hospital problems. *       Remains totally confused. Puzzling case. 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 Stop fluc    Repeat MRI to see if any evolution of findings - possible first MR was too soon.     Finished IV steroids    Accepted to [facility], awaiting bed    Requires continued inpatient level of care     Tanya Escobar MD    4:21 PM  8/27/2021

## 2021-08-27 NOTE — PROGRESS NOTES
Department of Internal Medicine  Infectious Diseases  Progress  Note      C/C :    Encephalopathy ?  Yeast in the CSF     Pt is awake and alert  Confused   Afebrile     Current Facility-Administered Medications   Medication Dose Route Frequency Provider Last Rate Last Admin    calcium carbonate (TUMS) chewable tablet 500 mg  500 mg Oral TID PRN Josr Shaw MD        fluconazole (DIFLUCAN) tablet 800 mg  800 mg Oral Daily Dylon Quispe MD   800 mg at 08/27/21 0855    vitamin B-12 (CYANOCOBALAMIN) tablet 1,000 mcg  1,000 mcg Oral Daily Cesar Muñozo, DO   1,000 mcg at 08/27/21 0855    sodium chloride flush 0.9 % injection 5-40 mL  5-40 mL IntraVENous 2 times per day Cesar Muñozo, DO   10 mL at 08/25/21 2021    sodium chloride flush 0.9 % injection 5-40 mL  5-40 mL IntraVENous PRN Cesar Motta, DO        0.9 % sodium chloride infusion  25 mL IntraVENous PRN Cesar Motta, DO        QUEtiapine (SEROQUEL) tablet 25 mg  25 mg Oral BID PRN Josr Shaw MD   25 mg at 08/26/21 1955    amLODIPine (NORVASC) tablet 5 mg  5 mg Oral Daily Naya Leas Learn, APRN - CNP   5 mg at 08/27/21 0855    traMADol (ULTRAM) tablet 50 mg  50 mg Oral Q6H PRN Naya Leas Learn, APRN - CNP   50 mg at 08/26/21 1955    sodium chloride flush 0.9 % injection 5-40 mL  5-40 mL IntraVENous 2 times per day Naya Leas Learn, APRN - CNP   10 mL at 08/25/21 1011    sodium chloride flush 0.9 % injection 10 mL  10 mL IntraVENous PRN Naya Leas Learn, APRN - CNP        0.9 % sodium chloride infusion  25 mL IntraVENous PRN Naya Leas Learn, APRN - CNP        ondansetron (ZOFRAN-ODT) disintegrating tablet 4 mg  4 mg Oral Q8H PRN Naya Leas Learn, APRN - CNP        Or    ondansetron (ZOFRAN) injection 4 mg  4 mg IntraVENous Q6H PRN Naya Leas Learn, APRN - CNP        acetaminophen (TYLENOL) tablet 650 mg  650 mg Oral Q6H PRN Naya Leas Learn, APRN - CNP        Or    acetaminophen (TYLENOL) suppository 650 mg  650 mg Rectal Q6H PRN El Mins Learn, APRN - CNP        magnesium hydroxide (MILK OF MAGNESIA) 400 MG/5ML suspension 30 mL  30 mL Oral Daily PRN El Mins Learn, APRN - CNP        aspirin chewable tablet 81 mg  81 mg Oral Daily El Mins Learn, APRN - CNP   81 mg at 08/27/21 0855    enoxaparin (LOVENOX) injection 40 mg  40 mg Subcutaneous Daily El Mins Learn, APRN - CNP   40 mg at 08/27/21 0855    atorvastatin (LIPITOR) tablet 40 mg  40 mg Oral Nightly El Mins Learn, APRN - CNP   40 mg at 08/26/21 1955    potassium chloride (KLOR-CON M) extended release tablet 40 mEq  40 mEq Oral PRN El Mins Learn, APRN - CNP        Or    potassium bicarb-citric acid (EFFER-K) effervescent tablet 40 mEq  40 mEq Oral PRN El Mins Learn, APRN - CNP   40 mEq at 08/21/21 6907    Or    potassium chloride 10 mEq/100 mL IVPB (Peripheral Line)  10 mEq IntraVENous PRN El Mins Learn, APRN - CNP        nitroGLYCERIN (NITROSTAT) SL tablet 0.4 mg  0.4 mg Sublingual Q5 Min PRN El Mins Learn, APRN - CNP             REVIEW OF SYSTEMS:      CONSTITUTIONAL:  Denies fever, chill or rigors  HEENT: Denies headache, neck pain , photophobia   RESPIRATORY: denies cough, shortness of breath, sputum expectoration, chest pain. CARDIOVASCULAR:  Denies palpitation  GASTROINTESTINAL:  Denies abdomen pain, diarrhea or constipation. GENITOURINARY:  Denies burning urination or frequency of urination  INTEGUMENT: denies wound , rash  HEMATOLOGIC/LYMPHATIC:  Denies lymph node swelling, gum bleeding or easy bruising. MUSCULOSKELETAL:  Denies leg pain , joint pain , joint swelling  NEUROLOGICAL:  Change in mental status     PHYSICAL EXAM:      Vitals:     Vitals:    08/27/21 0825   BP: 138/79   Pulse: 65   Resp: 20   Temp: 97.4 °F (36.3 °C)   SpO2: 97%       General Appearance:    Awake, alert , no acute distress. Head:    Normocephalic, atraumatic   Eyes:    No pallor, no icterus,   Ears:    No obvious deformity or drainage.    Nose:   No nasal drainage   Throat:   Mucosa moist, no oral thrush   Neck:   Supple, no lymphadenopathy   Back:     no CVA tenderness   Lungs:     Clear to auscultation bilaterally, no wheeze    Heart:    Regular rate and rhythm, no murmur, rub or gallop   Abdomen:     Soft, non-tender, bowel sounds present    Extremities:   No edema, no cyanosis   Pulses:   Dorsalis pedis palpable    Skin:   no rashes or lesions     CBC with Differential:      Lab Results   Component Value Date    WBC 10.7 08/27/2021    RBC 4.92 08/27/2021    HGB 15.0 08/27/2021    HCT 43.9 08/27/2021     08/27/2021    MCV 89.2 08/27/2021    MCH 30.5 08/27/2021    MCHC 34.2 08/27/2021    RDW 13.3 08/27/2021    SEGSPCT 85 11/07/2012    LYMPHOPCT 10.7 08/27/2021    MONOPCT 8.1 08/27/2021    BASOPCT 0.1 08/27/2021    MONOSABS 0.86 08/27/2021    LYMPHSABS 1.14 08/27/2021    EOSABS 0.00 08/27/2021    BASOSABS 0.01 08/27/2021       CMP     Lab Results   Component Value Date     08/27/2021    K 3.6 08/27/2021    K 3.7 08/20/2021     08/27/2021    CO2 28 08/27/2021    BUN 34 08/27/2021    CREATININE 0.7 08/27/2021    GFRAA >60 08/27/2021    LABGLOM >60 08/27/2021    GLUCOSE 134 08/27/2021    GLUCOSE 91 03/10/2011    PROT 6.2 08/27/2021    LABALBU 3.6 08/27/2021    LABALBU 4.7 03/10/2011    CALCIUM 8.7 08/27/2021    BILITOT 0.4 08/27/2021    ALKPHOS 100 08/27/2021    AST 26 08/27/2021    ALT 40 08/27/2021         Hepatic Function Panel:    Lab Results   Component Value Date    ALKPHOS 100 08/27/2021    ALT 40 08/27/2021    AST 26 08/27/2021    PROT 6.2 08/27/2021    BILITOT 0.4 08/27/2021    BILIDIR 0.1 11/07/2012    LABALBU 3.6 08/27/2021    LABALBU 4.7 03/10/2011       PT/INR:    Lab Results   Component Value Date    PROTIME 11.6 08/22/2021    PROTIME 11.3 11/02/2010    INR 1.1 08/22/2021       TSH:    Lab Results   Component Value Date    TSH 1.270 08/20/2021       U/A:    Lab Results   Component Value Date    COLORU Yellow 08/19/2021    PHUR 8.0 08/19/2021    WBCUA 1-3 08/19/2021    WBCUA 0-1 03/10/2011    RBCUA NONE 08/19/2021    RBCUA 10-20 11/07/2012    BACTERIA RARE 08/19/2021    CLARITYU Clear 08/19/2021    SPECGRAV 1.015 08/19/2021    LEUKOCYTESUR TRACE 08/19/2021    UROBILINOGEN 2.0 08/19/2021    BILIRUBINUR Negative 08/19/2021    BILIRUBINUR NEGATIVE 03/10/2011    BLOODU Negative 08/19/2021    GLUCOSEU Negative 08/19/2021    GLUCOSEU NEGATIVE 03/10/2011    AMORPHOUS MANY 11/07/2012       ABG:  No results found for: AMA7OUV, BEART, F7HSCWBP, PHART, THGBART, XDJ6RFC, PO2ART, TQL5NNB    MICROBIOLOGY:    CSF - cytology     DIAGNOSIS   NEGATIVE FOR MALIGNANT CELLS     Rare budding yeast forms in association with anucleate squames in an   otherwise acellular specimen, see comment. CSF cx neg to date   CSF - encephalitis panel neg       Radiology :    MRI brain -  Impression:        1. No acute intracranial abnormality. 2. No mass, mass effect, edema or hemorrhage. IMPRESSION:     1. Acute confusional state - CSF unremarkable , but cytology revealed budding yeast with acellular specimen.- CSF cx neg, Cryptococcus antigen negative , NMDA neg   2. Leukocytosis - improved     RECOMMENDATIONS:      1.  Stop diflucan

## 2021-08-28 PROCEDURE — 1200000000 HC SEMI PRIVATE

## 2021-08-28 PROCEDURE — 6370000000 HC RX 637 (ALT 250 FOR IP): Performed by: FAMILY MEDICINE

## 2021-08-28 PROCEDURE — 6370000000 HC RX 637 (ALT 250 FOR IP): Performed by: PSYCHIATRY & NEUROLOGY

## 2021-08-28 PROCEDURE — 6360000002 HC RX W HCPCS: Performed by: FAMILY MEDICINE

## 2021-08-28 PROCEDURE — 6370000000 HC RX 637 (ALT 250 FOR IP): Performed by: INTERNAL MEDICINE

## 2021-08-28 PROCEDURE — 99233 SBSQ HOSP IP/OBS HIGH 50: CPT | Performed by: NURSE PRACTITIONER

## 2021-08-28 RX ADMIN — Medication 1000 MCG: at 10:04

## 2021-08-28 RX ADMIN — ENOXAPARIN SODIUM 40 MG: 40 INJECTION SUBCUTANEOUS at 10:04

## 2021-08-28 RX ADMIN — TRAMADOL HYDROCHLORIDE 50 MG: 50 TABLET, FILM COATED ORAL at 10:04

## 2021-08-28 RX ADMIN — ASPIRIN 81 MG CHEWABLE TABLET 81 MG: 81 TABLET CHEWABLE at 10:04

## 2021-08-28 RX ADMIN — QUETIAPINE FUMARATE 25 MG: 25 TABLET ORAL at 19:53

## 2021-08-28 RX ADMIN — ATORVASTATIN CALCIUM 40 MG: 40 TABLET, FILM COATED ORAL at 19:53

## 2021-08-28 RX ADMIN — AMLODIPINE BESYLATE 5 MG: 5 TABLET ORAL at 10:04

## 2021-08-28 ASSESSMENT — PAIN SCALES - GENERAL
PAINLEVEL_OUTOF10: 0
PAINLEVEL_OUTOF10: 4

## 2021-08-28 ASSESSMENT — PAIN DESCRIPTION - PROGRESSION: CLINICAL_PROGRESSION: NOT CHANGED

## 2021-08-28 ASSESSMENT — PAIN - FUNCTIONAL ASSESSMENT: PAIN_FUNCTIONAL_ASSESSMENT: ACTIVITIES ARE NOT PREVENTED

## 2021-08-28 ASSESSMENT — PAIN DESCRIPTION - FREQUENCY: FREQUENCY: INTERMITTENT

## 2021-08-28 ASSESSMENT — PAIN DESCRIPTION - LOCATION: LOCATION: NECK

## 2021-08-28 ASSESSMENT — PAIN DESCRIPTION - DESCRIPTORS: DESCRIPTORS: ACHING;SORE

## 2021-08-28 ASSESSMENT — PAIN DESCRIPTION - ONSET: ONSET: GRADUAL

## 2021-08-28 ASSESSMENT — PAIN DESCRIPTION - PAIN TYPE: TYPE: CHRONIC PAIN

## 2021-08-28 NOTE — PROGRESS NOTES
Reji Amado is a 64 y.o. left handed male     Neuro is following for an altered mental status    PMH: HTN, muscular dystrophy    Patient was admitted with increased confusion--over the past month he has become more focused on killing people and dying himself. Previously followed with  in 2015 for CMT--who recommended muscle biopsy given his elevated CKs and EMG findings in 2015. He has never undergone any genetic testing, and never followed through with muscle biopsy testing. +family hx of Alzheimer's dementia in his father at a young age (62). Has property with high grass and goes for walks at time--no known tick bite    Cryptococcal antigen and autoimmune encephalitis negative. Repeat MRI of the brain was ordered by primary. He is now off antifungal.  There is been no change in his mental status and he continues to say inappropriate things and answer questions strangely, but follows all commands. Transfer to [facility] is still pending. He has been afebrile and medically stable. His wife is at the bedside.     Current Facility-Administered Medications   Medication Dose Route Frequency Provider Last Rate Last Admin    calcium carbonate (TUMS) chewable tablet 500 mg  500 mg Oral TID PRN Diamnod Maldonado MD        vitamin B-12 (CYANOCOBALAMIN) tablet 1,000 mcg  1,000 mcg Oral Daily Ladena Loge, DO   1,000 mcg at 08/28/21 1004    sodium chloride flush 0.9 % injection 5-40 mL  5-40 mL IntraVENous 2 times per day Ladena Loge, DO   10 mL at 08/25/21 2021    sodium chloride flush 0.9 % injection 5-40 mL  5-40 mL IntraVENous PRN Ladena Loge, DO        0.9 % sodium chloride infusion  25 mL IntraVENous PRN Ladena Loge, DO        QUEtiapine (SEROQUEL) tablet 25 mg  25 mg Oral BID PRN Diamond Maldonado MD   25 mg at 08/26/21 1955    amLODIPine (NORVASC) tablet 5 mg  5 mg Oral Daily Jules Fajardo Learn, APRN - CNP   5 mg at 08/28/21 1004    traMADol (ULTRAM) tablet 50 mg  50 mg Oral Q6H PRN Abena Maizes Learn, APRN - CNP   50 mg at 08/28/21 1004    sodium chloride flush 0.9 % injection 5-40 mL  5-40 mL IntraVENous 2 times per day Abena Maizes Learn, APRN - CNP   10 mL at 08/25/21 1011    sodium chloride flush 0.9 % injection 10 mL  10 mL IntraVENous PRN Abena Maizes Learn, APRN - CNP        0.9 % sodium chloride infusion  25 mL IntraVENous PRN Abena Maizes Learn, APRN - CNP        ondansetron (ZOFRAN-ODT) disintegrating tablet 4 mg  4 mg Oral Q8H PRN Abena Maizes Learn, APRN - CNP        Or    ondansetron [facility]) injection 4 mg  4 mg IntraVENous Q6H PRN Abena Maizes Learn, APRN - CNP        acetaminophen (TYLENOL) tablet 650 mg  650 mg Oral Q6H PRN Abena Maizes Learn, APRN - CNP        Or    acetaminophen (TYLENOL) suppository 650 mg  650 mg Rectal Q6H PRN Abena Maizes Learn, APRN - CNP        magnesium hydroxide (MILK OF MAGNESIA) 400 MG/5ML suspension 30 mL  30 mL Oral Daily PRN Abena Maizes Learn, APRN - CNP        aspirin chewable tablet 81 mg  81 mg Oral Daily Abena Maizes Learn, APRN - CNP   81 mg at 08/28/21 1004    enoxaparin (LOVENOX) injection 40 mg  40 mg Subcutaneous Daily Abena Maizes Learn, APRN - CNP   40 mg at 08/28/21 1004    atorvastatin (LIPITOR) tablet 40 mg  40 mg Oral Nightly Tona Maizes Learn, APRN - CNP   40 mg at 08/27/21 2125    potassium chloride (KLOR-CON M) extended release tablet 40 mEq  40 mEq Oral PRN Abena Maizes Learn, APRN - CNP        Or    potassium bicarb-citric acid (EFFER-K) effervescent tablet 40 mEq  40 mEq Oral PRN Abena Maizes Learn, APRN - CNP   40 mEq at 08/21/21 0467    Or    potassium chloride 10 mEq/100 mL IVPB (Peripheral Line)  10 mEq IntraVENous PRN Abena Maizes Learn, APRN - CNP        nitroGLYCERIN (NITROSTAT) SL tablet 0.4 mg  0.4 mg Sublingual Q5 Min PRN Abena Maizes Learn, APRN - CNP         Objective:     BP (!) 136/90   Pulse 74   Temp 98.2 °F (36.8 °C) (Infrared)   Resp 16   Ht 5' 11\" (1.803 m)   Wt 285 lb 7 oz (129.5 kg)   SpO2 95% BMI 39.81 kg/m²      General appearance: Awake, cooperative, lying in bed in no distress  Head: Normocephalic, without obvious abnormality, atraumatic  Eyes: sclera clear  Neck: full ROM; no Lhermitte's  Heart: RRR  Lungs: clear throughout  Extremities: no cyanosis or edema  Pulses: 2+ and symmetric  Skin: no rashes or lesions    Mental Status: Awake, oriented to person, year, knows he is in the hospital.  Does not want to answer questions about the date or time and states \"I do not keep track of that stuff\"    Fairly good attention and concentration  Follows all simple and two step commands fairly well    Speech: Mildly dysarthric  Language: talks nonsensically at times with inappropriate sexual references    Cranial Nerves:  I: smell    II: visual acuity     II: visual fields Full   II: pupils JOSE   III,VII: ptosis None   III,IV,VI: extraocular muscles  EOMI without nystagmus    V: mastication Normal   V: facial light touch sensation  Normal   V,VII: corneal reflex     VII: facial muscle function - upper     VII: facial muscle function - lower Normal   VIII: hearing Normal   IX: soft palate elevation  Normal   IX,X: gag reflex    XI: trapezius strength  5/5   XI: sternocleidomastoid strength 5/5   XI: neck extension strength  5/5   XII: tongue strength  Normal     Motor:  5/5 throughout--except 3/5 BLE with foot drop   Normal bulk and tone  No drift or abnormal movements    Sensory:  LT normal in all limbs    Coordination:   Mild ataxia with FN b/l-worse on right but improved with repetition  FFM and JEAN slightly slower on R    DTR:   No Babinski  No Mirza's     Bilateral palmar grasp  No suck reflex appreciated    Laboratory/Radiology:     CBC with Differential:    Lab Results   Component Value Date    WBC 10.7 08/27/2021    RBC 4.92 08/27/2021    HGB 15.0 08/27/2021    HCT 43.9 08/27/2021     08/27/2021    MCV 89.2 08/27/2021    MCH 30.5 08/27/2021    MCHC 34.2 08/27/2021    RDW 13.3 08/27/2021 SEGSPCT 85 11/07/2012    LYMPHOPCT 10.7 08/27/2021    MONOPCT 8.1 08/27/2021    BASOPCT 0.1 08/27/2021    MONOSABS 0.86 08/27/2021    LYMPHSABS 1.14 08/27/2021    EOSABS 0.00 08/27/2021    BASOSABS 0.01 08/27/2021     CMP:    Lab Results   Component Value Date     08/27/2021    K 3.6 08/27/2021    K 3.7 08/20/2021     08/27/2021    CO2 28 08/27/2021    BUN 34 08/27/2021    CREATININE 0.7 08/27/2021    GFRAA >60 08/27/2021    LABGLOM >60 08/27/2021    GLUCOSE 134 08/27/2021    GLUCOSE 91 03/10/2011    PROT 6.2 08/27/2021    LABALBU 3.6 08/27/2021    LABALBU 4.7 03/10/2011    CALCIUM 8.7 08/27/2021    BILITOT 0.4 08/27/2021    ALKPHOS 100 08/27/2021    AST 26 08/27/2021    ALT 40 08/27/2021     MRI Brain:  1. No acute intracranial abnormality. 2. No mass, mass effect, edema or hemorrhage. EEG: Normal     LP 8/22  Protein 22  WBC <3  Crypto Ag neg  Autoimmune enceph neg  Lyme neg    R/p MRI brain [facility] pending    I personally reviewed the patient's lab and imaging studies at this time. Assessment:     Altered mental status: unclear etiology no improvement. Extensive work-up for autoimmune encephalitis and other types of infectious pathologies have proven unrevealing. Repeat contrasted MRI of the brain is pending. Still must consider neurodegenerative disease diseases such as Lewy Body Dementia in light of his family history.     Muscular dystrophy    Plan:     Agree with repeat MRI brain with and without    Consider PET scan    Transfer to [facility] pending    Discussed with wife    Will follow     OSEAS Lyles CNP  10:21 AM  8/28/2021

## 2021-08-29 VITALS
WEIGHT: 285.44 LBS | HEART RATE: 90 BPM | TEMPERATURE: 97.8 F | HEIGHT: 71 IN | RESPIRATION RATE: 16 BRPM | SYSTOLIC BLOOD PRESSURE: 134 MMHG | BODY MASS INDEX: 39.96 KG/M2 | OXYGEN SATURATION: 95 % | DIASTOLIC BLOOD PRESSURE: 71 MMHG

## 2021-08-29 LAB
ALBUMIN SERPL-MCNC: 3.4 G/DL (ref 3.5–5.2)
ALP BLD-CCNC: 109 U/L (ref 40–129)
ALT SERPL-CCNC: 42 U/L (ref 0–40)
ANION GAP SERPL CALCULATED.3IONS-SCNC: 11 MMOL/L (ref 7–16)
AST SERPL-CCNC: 28 U/L (ref 0–39)
BASOPHILS ABSOLUTE: 0.02 E9/L (ref 0–0.2)
BASOPHILS RELATIVE PERCENT: 0.2 % (ref 0–2)
BILIRUB SERPL-MCNC: 0.9 MG/DL (ref 0–1.2)
BUN BLDV-MCNC: 17 MG/DL (ref 6–23)
CALCIUM SERPL-MCNC: 8.6 MG/DL (ref 8.6–10.2)
CHLORIDE BLD-SCNC: 101 MMOL/L (ref 98–107)
CO2: 26 MMOL/L (ref 22–29)
CREAT SERPL-MCNC: 0.6 MG/DL (ref 0.7–1.2)
EOSINOPHILS ABSOLUTE: 0.4 E9/L (ref 0.05–0.5)
EOSINOPHILS RELATIVE PERCENT: 3.8 % (ref 0–6)
GFR AFRICAN AMERICAN: >60
GFR NON-AFRICAN AMERICAN: >60 ML/MIN/1.73
GLUCOSE BLD-MCNC: 94 MG/DL (ref 74–99)
HCT VFR BLD CALC: 46.2 % (ref 37–54)
HEMOGLOBIN: 15.8 G/DL (ref 12.5–16.5)
IMMATURE GRANULOCYTES #: 0.12 E9/L
IMMATURE GRANULOCYTES %: 1.1 % (ref 0–5)
LYMPHOCYTES ABSOLUTE: 1.57 E9/L (ref 1.5–4)
LYMPHOCYTES RELATIVE PERCENT: 15 % (ref 20–42)
MCH RBC QN AUTO: 30.7 PG (ref 26–35)
MCHC RBC AUTO-ENTMCNC: 34.2 % (ref 32–34.5)
MCV RBC AUTO: 89.7 FL (ref 80–99.9)
MONOCYTES ABSOLUTE: 0.73 E9/L (ref 0.1–0.95)
MONOCYTES RELATIVE PERCENT: 7 % (ref 2–12)
NEUTROPHILS ABSOLUTE: 7.64 E9/L (ref 1.8–7.3)
NEUTROPHILS RELATIVE PERCENT: 72.9 % (ref 43–80)
PDW BLD-RTO: 13.2 FL (ref 11.5–15)
PLATELET # BLD: 133 E9/L (ref 130–450)
PMV BLD AUTO: 10.7 FL (ref 7–12)
POTASSIUM SERPL-SCNC: 4 MMOL/L (ref 3.5–5)
RBC # BLD: 5.15 E12/L (ref 3.8–5.8)
SODIUM BLD-SCNC: 138 MMOL/L (ref 132–146)
TOTAL PROTEIN: 6.4 G/DL (ref 6.4–8.3)
VITAMIN B1 WHOLE BLOOD: 138 NMOL/L (ref 70–180)
WBC # BLD: 10.5 E9/L (ref 4.5–11.5)

## 2021-08-29 PROCEDURE — 6360000002 HC RX W HCPCS: Performed by: FAMILY MEDICINE

## 2021-08-29 PROCEDURE — 36415 COLL VENOUS BLD VENIPUNCTURE: CPT

## 2021-08-29 PROCEDURE — 6370000000 HC RX 637 (ALT 250 FOR IP): Performed by: FAMILY MEDICINE

## 2021-08-29 PROCEDURE — 80053 COMPREHEN METABOLIC PANEL: CPT

## 2021-08-29 PROCEDURE — 85025 COMPLETE CBC W/AUTO DIFF WBC: CPT

## 2021-08-29 PROCEDURE — 6370000000 HC RX 637 (ALT 250 FOR IP): Performed by: PSYCHIATRY & NEUROLOGY

## 2021-08-29 RX ADMIN — ASPIRIN 81 MG CHEWABLE TABLET 81 MG: 81 TABLET CHEWABLE at 09:24

## 2021-08-29 RX ADMIN — Medication 1000 MCG: at 09:24

## 2021-08-29 RX ADMIN — AMLODIPINE BESYLATE 5 MG: 5 TABLET ORAL at 09:24

## 2021-08-29 RX ADMIN — ENOXAPARIN SODIUM 40 MG: 40 INJECTION SUBCUTANEOUS at 09:24

## 2021-08-29 ASSESSMENT — PAIN SCALES - GENERAL: PAINLEVEL_OUTOF10: 0

## 2021-08-29 NOTE — PROGRESS NOTES
Bud Gamez, wife, called and notified that Johny Gray has a bed at [address] [facility] today. Bud Gamez wants receiving unit to call her when he gets there.  Discharge papers marked with that request.
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
## 2021-08-29 NOTE — DISCHARGE INSTR - COC
Continuity of Care Form    Patient Name: Tariq Garcia   :  1960  MRN:  [id number]    Admit date:  2021  Discharge date:2021    Code Status Order: Full Code   Advance Directives:     Admitting Physician:  Odilon Collins MD  PCP: Samantha Combs MD      [address] Unit/Room#: 9123/5537-D  Discharging Unit Phone Number: [phone]  Emergency Contact:   Extended Emergency Contact Information  Primary Emergency Contact: Angela Rangel  Address: [address] [place] [address]  Mobile Phone: [phone]  Relation: Spouse    Past Surgical History:  Past Surgical History:   Procedure Laterality Date    CYST REMOVAL      on tailbone when was a child       Immunization History: There is no immunization history on file for this patient.     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    Elevated troponin R77.8    Altered mental status R41.82    MD (muscular dystrophy) ([facility] G71.00    Hypertension I10    Severe obesity (BMI 35.0-35.9 with comorbidity) ([facility] E66.01, Z68.35    Encounter for psychiatric assessment Z76.89       Isolation/Infection:   Isolation          No Isolation        Patient Infection Status     Infection Onset Added Last Indicated Last Indicated By Review Planned Expiration Resolved Resolved By    None active    Resolved    COVID-19 Rule Out 21 COVID-19 & Influenza Combo (Ordered)   21 Rule-Out Test Resulted          Nurse Assessment:  Last Vital Signs: /71   Pulse 90   Temp 97.8 °F (36.6 °C) (Infrared)   Resp 16   Ht 5' 11\" (1.803 m)   Wt 285 lb 7 oz (129.5 kg)   SpO2 95%   BMI 39.81 kg/m²     Last documented pain score (0-10 scale): Pain Level: 0  Last Weight:   Wt Readings from Last 1 Encounters:   21 285 lb 7 oz (129.5 kg)     Mental Status: disoriented and alert    IV Access:  - None    Nursing Mobility/ADLs:  Walking   Assisted  Transfer  Assisted  Bathing  Assisted  Dressing  Assisted  Toileting  Assisted  Feeding  Independent  Med [address]  Assisted  Med Delivery   whole    Wound Care Documentation and Therapy:        Elimination:  Continence:   · Bowel: Yes  · Bladder: Incontinent at times  Urinary Catheter: None   Colostomy/Ileostomy/Ileal Conduit: none  Date of Last BM:  Intake/Output Summary (Last 24 hours) at 8/29/2021 1218  Last data filed at 8/29/2021 8673  Gross per 24 hour   Intake 480 ml   Output --   Net 480 ml     I/O last 3 completed shifts: In: 18 [P.O.:480]  Out: -     Safety Concerns:     Sundowners [place] and At Risk for Falls    Impairments/Disabilities:      confusion    Nutrition Therapy:  Current Nutrition Therapy:   - Oral Diet:  General no Caffeine    Routes of Feeding: Oral  Liquids: No Restrictions  Daily Fluid Restriction: no  Last Modified Barium Swallow with Video (Video Swallowing Test): not done    Treatments at the Time of Hospital Discharge:   Respiratory Treatments: smi every 1-2 hours  Oxygen Therapy:  is not on home oxygen therapy. Ventilator:    - No ventilator support    Rehab Therapies: Physical Therapy and Occupational Therapy  Weight Bearing Status/Restrictions: No weight bearing restirctions  Other Medical Equipment (for information only, NOT a DME order):   Walker commode  Other Treatments:None  Patient's personal belongings (please select all that are sent with patient):  Glasses, clothing    RN SIGNATURE:  {Esignature:[id number]}    CASE MANAGEMENT/SOCIAL WORK SECTION    Inpatient Status Date: ***    Readmission Risk Assessment Score:  Readmission Risk              Risk of Unplanned Readmission:  9           Discharging to Facility/ Agency   · Name:   · Address:  · Phone:  · Fax:    Dialysis Facility (if applicable)   · Name:  · Address:  · Dialysis Schedule:  · Phone:  · Fax:    /

## 2021-08-29 NOTE — PLAN OF CARE
Problem: Falls - Risk of:  Goal: Will remain free from falls  Description: Will remain free from falls  Outcome: Met This Shift  Goal: Absence of physical injury  Description: Absence of physical injury  Outcome: Met This Shift     Problem: Mental Status - Impaired:  Goal: Mental status will improve  Description: Mental status will improve  Outcome: Not Met This Shift     Problem: Skin Integrity:  Goal: Absence of new skin breakdown  Description: Absence of new skin breakdown  Outcome: Met This Shift     Problem: Pain:  Goal: Pain level will decrease  Description: Pain level will decrease  Outcome: Ongoing  Goal: Control of chronic pain  Description: Control of chronic pain  Outcome: Met This Shift

## 2021-08-29 NOTE — PROGRESS NOTES
Access center [facility] [place] has bed at [address] [facility] [address]. Dr Megan Ariza notified and will discharge. Estimated time of departure is [id number].

## 2021-08-29 NOTE — PLAN OF CARE
Problem: Falls - Risk of:  Goal: Will remain free from falls  Description: Will remain free from falls  Outcome: Met This Shift  Goal: Absence of physical injury  Description: Absence of physical injury  Outcome: Met This Shift     Problem: Mental Status - Impaired:  Goal: Mental status will improve  Description: Mental status will improve  Outcome: Not Met This Shift

## 2021-08-29 NOTE — PROGRESS NOTES
Attempted to call report for the second time to [facility]. First attempted I was told to try to call back in about a 1/2hr because a patient just  on the unit. Upon second attempt I was told the nurse that was assigned to the patient was not presently on the floor. I left my call back number.

## 2021-08-29 NOTE — DISCHARGE SUMMARY
Physician Discharge Summary     Patient ID:  Concepcion Bae  [id number]  64 y.o.  1960    Admit date: 8/19/2021    Discharge date and time:  8/29/2021     Admission Diagnoses:   Chief Complaint   Patient presents with    Altered Mental Status     patient DX with MD, per EMS family reported that he's been acting different, was driving car and told wife that he was going to kill them      Altered mental status     Discharge Diagnoses:   Principal Problem:    Altered mental status  Active Problems:    Elevated troponin    MD (muscular dystrophy) ([facility]    Hypertension    Severe obesity (BMI 35.0-35.9 with comorbidity) ([facility]    Encounter for psychiatric assessment  Resolved Problems:    * No resolved hospital problems. *       Consults: Neuro, ID, psych    Procedures: LP - unremarkable    Hospital Course:   Patient apparently has no prior psychiatric history and has a relatively poorly defined prior diagnosis of muscular dystrophy. He was brought to the hospital due to an acute, dramatic mental status change. Apparently he became acutely emotionally dysregulated, yelling that he was going to kill his family, swerving around traffic, and just totally disorganized and bizarre acting. He has been hypersexual and agitated at times. An extensive evaluation was undertaken here in the hospital including lumbar puncture and MRI, everything has been negative. A repeat MRI was ordered about 1 week after presentation to see if maybe there would be some evolving changes but that has been delayed due to the hospital just being completely overwhelmed with patients. He was given high-dose IV steroids by neurology for questionable encephalitis but his lumbar puncture did not really show any signs of encephalitis ultimately. He has had absolutely no improvement in his mental status. Psychiatry saw him as well and their initial thought was that this is a neurologic or medical problem.   He has not had any major metabolic derangements either. At this time neurology is thinking this might be some presentation of Lewy body dementia, though the acuity is not really fitting. Ultimately his wife [facility] for further work-up and management     Discharge Exam:  Vitals:    08/28/21 0600 08/28/21 0800 08/28/21 1501 08/29/21 0915   BP:  (!) 136/90 128/82 134/71   Pulse:  74 76 90   Resp:  16 16 16   Temp:  98.2 °F (36.8 °C) 98 °F (36.7 °C) 97.8 °F (36.6 °C)   TempSrc:  Infrared Infrared Infrared   SpO2:  95% 96% 95%   Weight: 285 lb 7 oz (129.5 kg)      Height:            General appearance: NAD, conversant  HEENT: AT/NC, MMM  Neck: FROM, supple  Lungs: Clear to auscultation, WOB normal  CV: RRR, no MRGs  Abdomen: Soft, non-tender; no masses or HSM, +BS  Extremities: No peripheral edema or digital cyanosis  Skin: no rash, lesions or ulcers  Psych: Calm and cooperative at the moment. Pressured speech. Rambling on tangents. Most responses incongruous to the content of the conversation. Neuro: Alert and interactive, face symmetric, moving all extremities, speech fluent. Condition:  Stable    Disposition: [facility]    Patient Instructions:   NA     Activity: activity as tolerated  Diet: regular diet    Follow-up with PCP in 1 week.     Note that over 30 minutes was spent in preparing discharge papers, discussing discharge with patient, medication review, etc.    Signed:  Adwoa Burks MD    8/29/2021  12:17 PM

## 2021-09-03 LAB — VDRL CSF SCREEN: NON REACTIVE

## 2021-09-09 LAB
HERPES SIMPLEX VIRUS BY PCR: NOT DETECTED
HSV SOURCE: NORMAL

## 2021-09-18 PROBLEM — R77.8 ELEVATED TROPONIN: Status: RESOLVED | Noted: 2021-08-19 | Resolved: 2021-09-18

## 2021-09-18 PROBLEM — R79.89 ELEVATED TROPONIN: Status: RESOLVED | Noted: 2021-08-19 | Resolved: 2021-09-18

## 2024-05-13 ENCOUNTER — HOSPITAL ENCOUNTER (OUTPATIENT)
Age: 64
Setting detail: SPECIMEN
Discharge: HOME | End: 2024-05-13

## 2024-05-15 ENCOUNTER — HOSPITAL ENCOUNTER (OUTPATIENT)
Age: 64
Setting detail: SPECIMEN
Discharge: HOME | End: 2024-05-15
Payer: MEDICARE

## 2024-05-15 LAB
AMORPH SED URNS QL MICRO: PRESENT
BACTERIA URNS QL MICRO: ABNORMAL
BILIRUB UR QL STRIP: NEGATIVE
CLARITY UR: ABNORMAL
COLOR UR: YELLOW
CRYSTALS URNS MICRO: ABNORMAL /HPF
GLUCOSE UR STRIP-MCNC: NEGATIVE MG/DL
HGB UR QL STRIP.AUTO: NEGATIVE
KETONES UR STRIP-MCNC: NEGATIVE MG/DL
LEUKOCYTE ESTERASE UR QL STRIP: NEGATIVE
NITRITE UR QL STRIP: POSITIVE
PH UR STRIP: 8 [PH] (ref 5–9)
PROT UR STRIP-MCNC: NEGATIVE MG/DL
RBC #/AREA URNS HPF: ABNORMAL /HPF
SP GR UR STRIP: 1.01 (ref 1–1.03)
UROBILINOGEN UR STRIP-ACNC: 1 EU/DL (ref 0–1)
WBC #/AREA URNS HPF: ABNORMAL /HPF

## 2024-05-15 PROCEDURE — 87088 URINE BACTERIA CULTURE: CPT

## 2024-05-15 PROCEDURE — 81001 URINALYSIS AUTO W/SCOPE: CPT

## 2024-05-15 PROCEDURE — 87086 URINE CULTURE/COLONY COUNT: CPT

## 2024-05-17 LAB
MICROORGANISM SPEC CULT: ABNORMAL
SPECIMEN DESCRIPTION: ABNORMAL

## 2024-06-02 ENCOUNTER — APPOINTMENT (OUTPATIENT)
Dept: GENERAL RADIOLOGY | Age: 64
End: 2024-06-02
Payer: MEDICARE

## 2024-06-02 ENCOUNTER — APPOINTMENT (OUTPATIENT)
Dept: CT IMAGING | Age: 64
End: 2024-06-02
Payer: MEDICARE

## 2024-06-02 ENCOUNTER — HOSPITAL ENCOUNTER (INPATIENT)
Age: 64
LOS: 5 days | Discharge: OTHER INSTITUTION | End: 2024-06-07
Attending: EMERGENCY MEDICINE | Admitting: INTERNAL MEDICINE
Payer: MEDICARE

## 2024-06-02 DIAGNOSIS — S31.000A WOUND OF SACRAL REGION, INITIAL ENCOUNTER: Primary | ICD-10-CM

## 2024-06-02 DIAGNOSIS — R65.20 SEVERE SEPSIS (HCC): ICD-10-CM

## 2024-06-02 DIAGNOSIS — E87.6 HYPOKALEMIA: ICD-10-CM

## 2024-06-02 DIAGNOSIS — R79.89 ELEVATED TROPONIN: ICD-10-CM

## 2024-06-02 DIAGNOSIS — J96.91 RESPIRATORY FAILURE WITH HYPOXIA, UNSPECIFIED CHRONICITY (HCC): ICD-10-CM

## 2024-06-02 DIAGNOSIS — A41.9 SEVERE SEPSIS (HCC): ICD-10-CM

## 2024-06-02 LAB
ALBUMIN SERPL-MCNC: 3.1 G/DL (ref 3.5–5.2)
ALP SERPL-CCNC: 92 U/L (ref 40–129)
ALT SERPL-CCNC: 14 U/L (ref 0–40)
ANION GAP SERPL CALCULATED.3IONS-SCNC: 11 MMOL/L (ref 7–16)
AST SERPL-CCNC: 25 U/L (ref 0–39)
B PARAP IS1001 DNA NPH QL NAA+NON-PROBE: NOT DETECTED
B PERT DNA SPEC QL NAA+PROBE: NOT DETECTED
B.E.: 4.5 MMOL/L (ref -3–3)
BASOPHILS # BLD: 0.06 K/UL (ref 0–0.2)
BASOPHILS NFR BLD: 1 % (ref 0–2)
BILIRUB SERPL-MCNC: 0.4 MG/DL (ref 0–1.2)
BILIRUB UR QL STRIP: NEGATIVE
BNP SERPL-MCNC: 87 PG/ML (ref 0–125)
BUN SERPL-MCNC: 33 MG/DL (ref 6–23)
C PNEUM DNA NPH QL NAA+NON-PROBE: NOT DETECTED
CALCIUM SERPL-MCNC: 9.1 MG/DL (ref 8.6–10.2)
CHLORIDE SERPL-SCNC: 93 MMOL/L (ref 98–107)
CK SERPL-CCNC: 440 U/L (ref 20–200)
CLARITY UR: CLEAR
CO2 SERPL-SCNC: 30 MMOL/L (ref 22–29)
COHB: 1.2 % (ref 0–1.5)
COLOR UR: YELLOW
CREAT SERPL-MCNC: 1.1 MG/DL (ref 0.7–1.2)
CRITICAL: ABNORMAL
CRP SERPL HS-MCNC: 160 MG/L (ref 0–5)
DATE ANALYZED: ABNORMAL
DATE OF COLLECTION: ABNORMAL
EOSINOPHIL # BLD: 0.24 K/UL (ref 0.05–0.5)
EOSINOPHILS RELATIVE PERCENT: 2 % (ref 0–6)
ERYTHROCYTE [DISTWIDTH] IN BLOOD BY AUTOMATED COUNT: 14.6 % (ref 11.5–15)
ERYTHROCYTE [SEDIMENTATION RATE] IN BLOOD BY WESTERGREN METHOD: 114 MM/HR (ref 0–15)
FLUAV RNA NPH QL NAA+NON-PROBE: NOT DETECTED
FLUBV RNA NPH QL NAA+NON-PROBE: NOT DETECTED
GFR, ESTIMATED: 79 ML/MIN/1.73M2
GLUCOSE SERPL-MCNC: 236 MG/DL (ref 74–99)
GLUCOSE UR STRIP-MCNC: NEGATIVE MG/DL
HADV DNA NPH QL NAA+NON-PROBE: NOT DETECTED
HCO3: 28.4 MMOL/L (ref 22–26)
HCOV 229E RNA NPH QL NAA+NON-PROBE: NOT DETECTED
HCOV HKU1 RNA NPH QL NAA+NON-PROBE: NOT DETECTED
HCOV NL63 RNA NPH QL NAA+NON-PROBE: NOT DETECTED
HCOV OC43 RNA NPH QL NAA+NON-PROBE: NOT DETECTED
HCT VFR BLD AUTO: 36 % (ref 37–54)
HGB BLD-MCNC: 11.5 G/DL (ref 12.5–16.5)
HGB UR QL STRIP.AUTO: ABNORMAL
HHB: 4.3 % (ref 0–5)
HMPV RNA NPH QL NAA+NON-PROBE: NOT DETECTED
HPIV1 RNA NPH QL NAA+NON-PROBE: NOT DETECTED
HPIV2 RNA NPH QL NAA+NON-PROBE: NOT DETECTED
HPIV3 RNA NPH QL NAA+NON-PROBE: NOT DETECTED
HPIV4 RNA NPH QL NAA+NON-PROBE: NOT DETECTED
IMM GRANULOCYTES # BLD AUTO: 0.08 K/UL (ref 0–0.58)
IMM GRANULOCYTES NFR BLD: 1 % (ref 0–5)
KETONES UR STRIP-MCNC: NEGATIVE MG/DL
LAB: ABNORMAL
LACTATE BLDV-SCNC: 2.5 MMOL/L (ref 0.5–1.9)
LACTATE BLDV-SCNC: 3.9 MMOL/L (ref 0.5–1.9)
LEUKOCYTE ESTERASE UR QL STRIP: NEGATIVE
LIPASE SERPL-CCNC: 15 U/L (ref 13–60)
LYMPHOCYTES NFR BLD: 1.12 K/UL (ref 1.5–4)
LYMPHOCYTES RELATIVE PERCENT: 9 % (ref 20–42)
Lab: 1130
M PNEUMO DNA NPH QL NAA+NON-PROBE: NOT DETECTED
MAGNESIUM SERPL-MCNC: 2 MG/DL (ref 1.6–2.6)
MCH RBC QN AUTO: 29.2 PG (ref 26–35)
MCHC RBC AUTO-ENTMCNC: 31.9 G/DL (ref 32–34.5)
MCV RBC AUTO: 91.4 FL (ref 80–99.9)
METHB: 0.3 % (ref 0–1.5)
MODE: ABNORMAL
MONOCYTES NFR BLD: 0.88 K/UL (ref 0.1–0.95)
MONOCYTES NFR BLD: 7 % (ref 2–12)
NEUTROPHILS NFR BLD: 81 % (ref 43–80)
NEUTS SEG NFR BLD: 9.97 K/UL (ref 1.8–7.3)
NITRITE UR QL STRIP: NEGATIVE
O2 CONTENT: 16.1 ML/DL
O2 SATURATION: 95.6 % (ref 92–98.5)
O2HB: 94.2 % (ref 94–97)
OPERATOR ID: 1190
PATIENT TEMP: 37 C
PCO2: 39.7 MMHG (ref 35–45)
PH BLOOD GAS: 7.47 (ref 7.35–7.45)
PH UR STRIP: 6 [PH] (ref 5–9)
PLATELET # BLD AUTO: 281 K/UL (ref 130–450)
PMV BLD AUTO: 10 FL (ref 7–12)
PO2: 79.5 MMHG (ref 75–100)
POTASSIUM SERPL-SCNC: 3.2 MMOL/L (ref 3.5–5)
PROCALCITONIN SERPL-MCNC: 0.18 NG/ML (ref 0–0.08)
PROT SERPL-MCNC: 8.7 G/DL (ref 6.4–8.3)
PROT UR STRIP-MCNC: NEGATIVE MG/DL
RBC # BLD AUTO: 3.94 M/UL (ref 3.8–5.8)
RBC #/AREA URNS HPF: ABNORMAL /HPF
RSV RNA NPH QL NAA+NON-PROBE: NOT DETECTED
RV+EV RNA NPH QL NAA+NON-PROBE: NOT DETECTED
SARS-COV-2 RDRP RESP QL NAA+PROBE: NOT DETECTED
SARS-COV-2 RNA NPH QL NAA+NON-PROBE: NOT DETECTED
SODIUM SERPL-SCNC: 134 MMOL/L (ref 132–146)
SOURCE, BLOOD GAS: ABNORMAL
SP GR UR STRIP: 1.01 (ref 1–1.03)
SPECIMEN DESCRIPTION: NORMAL
SPECIMEN DESCRIPTION: NORMAL
THB: 12.1 G/DL (ref 11.5–16.5)
TIME ANALYZED: 1138
TROPONIN I SERPL HS-MCNC: 144 NG/L (ref 0–11)
TROPONIN I SERPL HS-MCNC: 161 NG/L (ref 0–11)
TROPONIN I SERPL HS-MCNC: 180 NG/L (ref 0–11)
UROBILINOGEN UR STRIP-ACNC: 0.2 EU/DL (ref 0–1)
WBC #/AREA URNS HPF: ABNORMAL /HPF
WBC OTHER # BLD: 12.4 K/UL (ref 4.5–11.5)

## 2024-06-02 PROCEDURE — 83880 ASSAY OF NATRIURETIC PEPTIDE: CPT

## 2024-06-02 PROCEDURE — 2500000003 HC RX 250 WO HCPCS: Performed by: INTERNAL MEDICINE

## 2024-06-02 PROCEDURE — 80053 COMPREHEN METABOLIC PANEL: CPT

## 2024-06-02 PROCEDURE — 0202U NFCT DS 22 TRGT SARS-COV-2: CPT

## 2024-06-02 PROCEDURE — 82550 ASSAY OF CK (CPK): CPT

## 2024-06-02 PROCEDURE — 6360000002 HC RX W HCPCS: Performed by: INTERNAL MEDICINE

## 2024-06-02 PROCEDURE — 6370000000 HC RX 637 (ALT 250 FOR IP): Performed by: INTERNAL MEDICINE

## 2024-06-02 PROCEDURE — 1200000000 HC SEMI PRIVATE

## 2024-06-02 PROCEDURE — 6360000002 HC RX W HCPCS: Performed by: SPECIALIST

## 2024-06-02 PROCEDURE — 87040 BLOOD CULTURE FOR BACTERIA: CPT

## 2024-06-02 PROCEDURE — 2580000003 HC RX 258: Performed by: SPECIALIST

## 2024-06-02 PROCEDURE — 71275 CT ANGIOGRAPHY CHEST: CPT

## 2024-06-02 PROCEDURE — 99285 EMERGENCY DEPT VISIT HI MDM: CPT

## 2024-06-02 PROCEDURE — 81001 URINALYSIS AUTO W/SCOPE: CPT

## 2024-06-02 PROCEDURE — 93005 ELECTROCARDIOGRAM TRACING: CPT

## 2024-06-02 PROCEDURE — 74177 CT ABD & PELVIS W/CONTRAST: CPT

## 2024-06-02 PROCEDURE — 83605 ASSAY OF LACTIC ACID: CPT

## 2024-06-02 PROCEDURE — 85025 COMPLETE CBC W/AUTO DIFF WBC: CPT

## 2024-06-02 PROCEDURE — 87635 SARS-COV-2 COVID-19 AMP PRB: CPT

## 2024-06-02 PROCEDURE — 83735 ASSAY OF MAGNESIUM: CPT

## 2024-06-02 PROCEDURE — 87086 URINE CULTURE/COLONY COUNT: CPT

## 2024-06-02 PROCEDURE — 99223 1ST HOSP IP/OBS HIGH 75: CPT | Performed by: INTERNAL MEDICINE

## 2024-06-02 PROCEDURE — 82805 BLOOD GASES W/O2 SATURATION: CPT

## 2024-06-02 PROCEDURE — 84145 PROCALCITONIN (PCT): CPT

## 2024-06-02 PROCEDURE — 85652 RBC SED RATE AUTOMATED: CPT

## 2024-06-02 PROCEDURE — 2580000003 HC RX 258

## 2024-06-02 PROCEDURE — 86140 C-REACTIVE PROTEIN: CPT

## 2024-06-02 PROCEDURE — 2580000003 HC RX 258: Performed by: INTERNAL MEDICINE

## 2024-06-02 PROCEDURE — 83690 ASSAY OF LIPASE: CPT

## 2024-06-02 PROCEDURE — 84484 ASSAY OF TROPONIN QUANT: CPT

## 2024-06-02 PROCEDURE — 6360000004 HC RX CONTRAST MEDICATION: Performed by: RADIOLOGY

## 2024-06-02 RX ORDER — SODIUM CHLORIDE 0.9 % (FLUSH) 0.9 %
5-40 SYRINGE (ML) INJECTION PRN
Status: DISCONTINUED | OUTPATIENT
Start: 2024-06-02 | End: 2024-06-07 | Stop reason: HOSPADM

## 2024-06-02 RX ORDER — SODIUM CHLORIDE 0.9 % (FLUSH) 0.9 %
5-40 SYRINGE (ML) INJECTION EVERY 12 HOURS SCHEDULED
Status: DISCONTINUED | OUTPATIENT
Start: 2024-06-02 | End: 2024-06-07 | Stop reason: HOSPADM

## 2024-06-02 RX ORDER — AMLODIPINE BESYLATE 5 MG/1
5 TABLET ORAL DAILY
Status: DISCONTINUED | OUTPATIENT
Start: 2024-06-02 | End: 2024-06-07 | Stop reason: HOSPADM

## 2024-06-02 RX ORDER — 0.9 % SODIUM CHLORIDE 0.9 %
1000 INTRAVENOUS SOLUTION INTRAVENOUS ONCE
Status: COMPLETED | OUTPATIENT
Start: 2024-06-02 | End: 2024-06-02

## 2024-06-02 RX ORDER — ONDANSETRON 4 MG/1
4 TABLET, ORALLY DISINTEGRATING ORAL EVERY 8 HOURS PRN
Status: DISCONTINUED | OUTPATIENT
Start: 2024-06-02 | End: 2024-06-07 | Stop reason: HOSPADM

## 2024-06-02 RX ORDER — ACETAMINOPHEN 650 MG/1
650 SUPPOSITORY RECTAL EVERY 6 HOURS PRN
Status: DISCONTINUED | OUTPATIENT
Start: 2024-06-02 | End: 2024-06-07 | Stop reason: HOSPADM

## 2024-06-02 RX ORDER — ONDANSETRON 2 MG/ML
4 INJECTION INTRAMUSCULAR; INTRAVENOUS EVERY 6 HOURS PRN
Status: DISCONTINUED | OUTPATIENT
Start: 2024-06-02 | End: 2024-06-07 | Stop reason: HOSPADM

## 2024-06-02 RX ORDER — POTASSIUM CHLORIDE 7.45 MG/ML
10 INJECTION INTRAVENOUS PRN
Status: DISCONTINUED | OUTPATIENT
Start: 2024-06-02 | End: 2024-06-07 | Stop reason: HOSPADM

## 2024-06-02 RX ORDER — TRAMADOL HYDROCHLORIDE 50 MG/1
50 TABLET ORAL EVERY 6 HOURS PRN
Status: DISCONTINUED | OUTPATIENT
Start: 2024-06-02 | End: 2024-06-07 | Stop reason: HOSPADM

## 2024-06-02 RX ORDER — MAGNESIUM SULFATE IN WATER 40 MG/ML
2000 INJECTION, SOLUTION INTRAVENOUS PRN
Status: DISCONTINUED | OUTPATIENT
Start: 2024-06-02 | End: 2024-06-07 | Stop reason: HOSPADM

## 2024-06-02 RX ORDER — POTASSIUM CHLORIDE 20 MEQ/1
40 TABLET, EXTENDED RELEASE ORAL PRN
Status: DISCONTINUED | OUTPATIENT
Start: 2024-06-02 | End: 2024-06-07 | Stop reason: HOSPADM

## 2024-06-02 RX ORDER — POLYETHYLENE GLYCOL 3350 17 G/17G
17 POWDER, FOR SOLUTION ORAL DAILY PRN
Status: DISCONTINUED | OUTPATIENT
Start: 2024-06-02 | End: 2024-06-07 | Stop reason: HOSPADM

## 2024-06-02 RX ORDER — ACETAMINOPHEN 325 MG/1
650 TABLET ORAL EVERY 6 HOURS PRN
Status: DISCONTINUED | OUTPATIENT
Start: 2024-06-02 | End: 2024-06-07 | Stop reason: HOSPADM

## 2024-06-02 RX ORDER — ENOXAPARIN SODIUM 100 MG/ML
30 INJECTION SUBCUTANEOUS 2 TIMES DAILY
Status: DISCONTINUED | OUTPATIENT
Start: 2024-06-02 | End: 2024-06-05

## 2024-06-02 RX ORDER — AMMONIUM LACTATE 12 G/100G
LOTION TOPICAL 2 TIMES DAILY
Status: DISCONTINUED | OUTPATIENT
Start: 2024-06-02 | End: 2024-06-07 | Stop reason: HOSPADM

## 2024-06-02 RX ORDER — POTASSIUM CHLORIDE 7.45 MG/ML
10 INJECTION INTRAVENOUS
Status: DISPENSED | OUTPATIENT
Start: 2024-06-02 | End: 2024-06-02

## 2024-06-02 RX ORDER — SODIUM CHLORIDE 9 MG/ML
INJECTION, SOLUTION INTRAVENOUS PRN
Status: DISCONTINUED | OUTPATIENT
Start: 2024-06-02 | End: 2024-06-07 | Stop reason: HOSPADM

## 2024-06-02 RX ORDER — SODIUM CHLORIDE 9 MG/ML
INJECTION, SOLUTION INTRAVENOUS CONTINUOUS
Status: DISCONTINUED | OUTPATIENT
Start: 2024-06-02 | End: 2024-06-03

## 2024-06-02 RX ADMIN — MICONAZOLE NITRATE: 20 POWDER TOPICAL at 21:00

## 2024-06-02 RX ADMIN — Medication: at 18:47

## 2024-06-02 RX ADMIN — POTASSIUM CHLORIDE 10 MEQ: 7.46 INJECTION, SOLUTION INTRAVENOUS at 15:58

## 2024-06-02 RX ADMIN — ENOXAPARIN SODIUM 30 MG: 100 INJECTION SUBCUTANEOUS at 22:00

## 2024-06-02 RX ADMIN — SODIUM CHLORIDE 1000 ML: 9 INJECTION, SOLUTION INTRAVENOUS at 10:00

## 2024-06-02 RX ADMIN — POTASSIUM CHLORIDE 10 MEQ: 7.46 INJECTION, SOLUTION INTRAVENOUS at 14:52

## 2024-06-02 RX ADMIN — WATER 2000 MG: 1 INJECTION INTRAMUSCULAR; INTRAVENOUS; SUBCUTANEOUS at 18:48

## 2024-06-02 RX ADMIN — MICONAZOLE NITRATE: 2 OINTMENT TOPICAL at 21:00

## 2024-06-02 RX ADMIN — AMLODIPINE BESYLATE 5 MG: 5 TABLET ORAL at 18:47

## 2024-06-02 RX ADMIN — IOPAMIDOL 75 ML: 755 INJECTION, SOLUTION INTRAVENOUS at 13:11

## 2024-06-02 RX ADMIN — SODIUM CHLORIDE: 9 INJECTION, SOLUTION INTRAVENOUS at 17:44

## 2024-06-02 RX ADMIN — SODIUM CHLORIDE 1000 ML: 9 INJECTION, SOLUTION INTRAVENOUS at 14:50

## 2024-06-02 RX ADMIN — SODIUM CHLORIDE, PRESERVATIVE FREE 10 ML: 5 INJECTION INTRAVENOUS at 19:50

## 2024-06-02 RX ADMIN — VANCOMYCIN HYDROCHLORIDE 3000 MG: 5 INJECTION, POWDER, LYOPHILIZED, FOR SOLUTION INTRAVENOUS at 14:55

## 2024-06-02 RX ADMIN — POTASSIUM CHLORIDE 10 MEQ: 7.46 INJECTION, SOLUTION INTRAVENOUS at 18:37

## 2024-06-02 RX ADMIN — SODIUM CHLORIDE: 9 INJECTION, SOLUTION INTRAVENOUS at 19:54

## 2024-06-02 ASSESSMENT — PAIN - FUNCTIONAL ASSESSMENT: PAIN_FUNCTIONAL_ASSESSMENT: NONE - DENIES PAIN

## 2024-06-02 ASSESSMENT — LIFESTYLE VARIABLES
HOW MANY STANDARD DRINKS CONTAINING ALCOHOL DO YOU HAVE ON A TYPICAL DAY: PATIENT DOES NOT DRINK
HOW OFTEN DO YOU HAVE A DRINK CONTAINING ALCOHOL: NEVER

## 2024-06-02 NOTE — ED NOTES
.ED to Inpatient Handoff Report    Notified Candace that electronic handoff available and patient ready for transport to room 528.    Safety Risks: Risk of falls    Patient in Restraints: no    Constant Observer or Patient : no    Telemetry Monitoring Ordered :Yes           Order to transfer to unit without monitor:YES    Last MEWS: 1 Time completed: 1555    Deterioration Index Score:   Predictive Model Details          25 (Normal)  Factor Value    Calculated 6/2/2024 15:57 44% Age 63 years old    Deterioration Index Model 17% Respiratory rate 19     11% WBC count abnormal (12.4 k/uL)     6% Pulse oximetry 100 %     5% Systolic 135     5% Blood pH abnormal (7.473)     5% Potassium abnormal (3.2 mmol/L)     4% BUN abnormal (33 mg/dL)     4% Sodium 134 mmol/L     1% Hematocrit abnormal (36.0 %)     1% Pulse 67     0% Temperature 98.6 °F (37 °C)        Vitals:    06/02/24 1533 06/02/24 1543 06/02/24 1553 06/02/24 1555   BP:  135/67  135/67   Pulse: 67 77 67 67   Resp: 19 20 19 19   Temp:    98.6 °F (37 °C)   SpO2: 100% 96% 100% 100%   Weight:       Height:             Opportunity for questions and clarification was provided.   1

## 2024-06-02 NOTE — ED NOTES
Took patient to the shower room and completely from head to toes front to back scrubbed with Melani soap and rinsed patient , Tranfered to  another bed with fresh linen and room 4 from 5

## 2024-06-02 NOTE — PROGRESS NOTES
Patient assessed. Wounds as below  Alert and verbal. States he lives with sister  Recent wound development last few months    Left buttocks below          Rt buttocks 10x5    Left posterior leg redness below    Leg wound below-left posterior leg wounds      Rt foot very edematous. Dried black/brown are dorsal          Left foot large but smaller than rt      Coccyx, intragluteal. Soft black. Chronic skin changes moisture, friction and shear        In recliner chair. States he can stand and pivot. Mostly in chair. Denies incontinence  Dressing to buttocks wounds.  Interdry around reddened scrotum  Antifungal cream to periwound  Lo air loss bed-razia  dietician  Skylar Greene, JACOBO, Wound Care        JACOBO Mullins, Wound Care

## 2024-06-02 NOTE — PLAN OF CARE
Addendum: I have personally participated in the history, exam, medical decision making with Leda Jack on the date of service and I agree with all of the pertinent clinical information unless otherwise noted. I have also reviewed and agree with the past medical, family, and social history unless otherwise noted.     Patient was admitted with sacral wound.     PHYSICAL EXAM:  Vitals:  /63   Pulse 76   Temp 98.6 °F (37 °C)   Resp 19   Ht 1.803 m (5' 11\")   Wt 129.3 kg (285 lb)   SpO2 100%   BMI 39.75 kg/m²   Gen: awake, alert, NAD  Lungs: clear to auscultation bilaterally no crackles no wheezing.  Heart: RRR, no murmur   Abdomen: soft nontender nondistended positive bowel sounds.  Extremities: full range of motion no peripheral edema.      Impression:  Principal Problem:    Sacral wound, initial encounter  Resolved Problems:    * No resolved hospital problems. *      My findings/plan include:    Sacral wound - F/U with BCx. ID and wound care have been consulted.  Lactic acidosis - Continue IVF and trend lactic acid q6 until less than 2  Leukocytosis 2/2 #1 - Continue to trend  NSTEMI - No ischemia noted on EKG. No anginal complaints. F/U with echo and cardiology has been consulted.   Essential HTN - Continue norvasc  DVT prophylaxis - Lovenox    PATIENT HAS BED BUGS    NOTE: This report was transcribed using voice recognition software. Every effort was made to ensure accuracy; however, inadvertent computerized transcription errors may be present.    Electronically signed by Gladys Henderson DO on 6/2/2024 at 3:32 PM

## 2024-06-02 NOTE — PROGRESS NOTES
4 Eyes Skin Assessment     NAME:  Fabio Rangel  YOB: 1960  MEDICAL RECORD NUMBER:  [id number]    The patient is being assessed for  Admission    I agree that at least one RN has performed a thorough Head to Toe Skin Assessment on the patient. ALL assessment sites listed below have been assessed.      Areas assessed by both nurses:    Head, Face, Ears, Shoulders, Back, Chest, Arms, Elbows, Hands, Sacrum. Buttock, Coccyx, Ischium, Legs. Feet and Heels, and Under Medical Devices         Does the Patient have a Wound? Yes wound(s) were present on assessment. LDA wound assessment was Initiated and completed by RN       Gus Prevention initiated by RN: Yes  Wound Care Orders initiated by RN: Yes    Pressure Injury (Stage 3,4, Unstageable, DTI, NWPT, and Complex wounds) if present, place Wound referral order by RN under : Yes    New Ostomies, if present place, Ostomy referral order under : No     Nurse 1 eSignature: Electronically signed by Divine Back RN on 6/2/24 at 7:04 PM EDT    **SHARE this note so that the co-signing nurse can place an eSignature**    Nurse 2 eSignature: {Esignature:[id number]}

## 2024-06-02 NOTE — H&P
[facility] Hospitalist Group History and Physical      CHIEF COMPLAINT:  sacral wound     History of Present Illness:      This is a 63 year old male with past medical history of hypertension, muscular dystrophy, and obesity who presents to ER from home with sacral wound, failure to thrive, and inability to care for himself at home. Lab workup: K 3.2, CO2 30, LA 3.9 < 2.5, , Troponin 180 > 161 > 144, WBC 12.4, Hgb 11.5, . Imaging shows right basilar lung atelectasis, biliary sludge, intrarenal calculi, and rectal constipation. Patient was given 1 L bolus bolus and potassium in ER.     Informant(s) for H&P: patient     REVIEW OF SYSTEMS:  A comprehensive review of systems was negative except for: what is in the HPI    PMH:  Past Medical History:   Diagnosis Date    Hypertension     MD (muscular dystrophy) ([facility]     Obesity (BMI 35.0-39.9 without comorbidity) 8/20/2021       Surgical History:  Past Surgical History:   Procedure Laterality Date    CYST REMOVAL      on [facility] was a child       Medications Prior to Admission:    Prior to Admission medications    Medication Sig Start Date End Date Taking? Authorizing Provider   traMADol (ULTRAM) 50 MG tablet Take 50 mg by mouth every 6 hours as needed for Pain.    Provider, MD Jeferson   amLODIPine (NORVASC) 5 MG tablet Take 5 mg by mouth daily.    Provider, MD Jeferson   Naproxen Sodium (ALEVE) 220 MG CAPS Take  by mouth.      Provider, MD Jeferson       Allergies:    Hydrocodone    Social History:    reports that he has never smoked. He has never used smokeless tobacco. He reports current alcohol use. He reports that he does not use drugs.    Family History:   family history is not on file.       PHYSICAL EXAM:  Vitals:  /63   Pulse 76   Temp 98.6 °F (37 °C)   Resp 19   Ht 1.803 m (5' 11\")   Wt 129.3 kg (285 lb)   SpO2 100%   BMI 39.75 kg/m²     General Appearance: alert and oriented to person, place and time and in no  Elevation/eventration of the right hemidiaphragm with some resultant   right basilar lung atelectasis.  There is minimal posterior basilar left   lower lobe scarring or minimal atelectasis.      3.  Isodense layering within the fundus of the gallbladder suggesting   possibly biliary sludge.  The gallbladder is otherwise unremarkable in   appearance.      4.  3-4 mm nonobstructing right intrarenal calculi.  No ureteral calculi or   obstructive uropathy is noted.      5.  Rectal constipation.  Diverticulosis without evidence of definite acute   diverticulitis. Otherwise nonspecific bowel gas pattern.      6.  Otherwise no acute pathology noted.           ASSESSMENT:      Principal Problem:    Sacral wound, initial encounter  Resolved Problems:    * No resolved hospital problems. *      PLAN:    Sepsis secondary to sacral wound - consult to ID. Continue antibiotics and IVF. .   Hypokalemia - K 3.2, replace and monitor.   Leukocytosis secondary to sacral wound - WBC 12.4. monitor labs and cultures.   Lactic acidosis secondary to above - LA 3.9 < 2.5. continue IVF.   NSTEMI - Troponin 180 > 161 > 144. cardiology consulted. No ischemia on EKG.   Anemia of chronic disease - Hgb 11.5. monitor and transfuse for less than 7.   HTN - continue amlodipine. Monitor BP and adjust accordingly.   Muscular dystrophy - inability to care for self at home with failure to thrive. . Family would like nursing care 24/7 in home, appreciate case management recommendations. Patient noted to have bed bugs in the house.     Code Status: full  DVT prophylaxis: Lovenox     30 minutes time spent reviewing patient chart, assessing patient, discussing plan of care with patient and family, discussing plan of care with collaborating physician, and charting.    Electronically signed by OSEAS Azevedo NP on 6/2/2024 at 3:16 PM

## 2024-06-02 NOTE — ED PROVIDER NOTES
[facility] MED SURG/TELE  EMERGENCY DEPARTMENT ENCOUNTER        Pt Name: Fabio Rangel  MRN: [id number]  Birthdate 1960  Date of evaluation: 6/2/2024  Provider: Peggy Arrington MD  PCP: Doris Martinez MD  Note Started: 9:36 AM EDT 6/2/24    CHIEF COMPLAINT       Chief Complaint   Patient presents with    Wound Check     Buttock    Failure To Thrive       HISTORY OF PRESENT ILLNESS: 1 or more Elements   History From: Patient    Limitations to history : None    Fabio Rangel is a 63 y.o. male who presents from home for significant sacral wound as well as failure to thrive and inability to care for himself at home.  Patient does live with his family but states they are hoping to get home health as they are having difficulty caring for him as well.  Patient did have fever of 100.2 by EMS.  Denies chest pain, shortness of breath, abdominal pain, nausea, vomiting, urinary symptoms.    Nursing Notes were all reviewed and agreed with or any disagreements were addressed in the HPI.        REVIEW OF EXTERNAL NOTE :       Patient was seen on 8/5/2022 for obesity, anxiety, muscular dystrophy, insomnia, HTN    REVIEW OF SYSTEMS :           Positives and Pertinent negatives as per HPI.     SURGICAL HISTORY     Past Surgical History:   Procedure Laterality Date    CYST REMOVAL      on tailbone when was a child       CURRENTMEDICATIONS       Current Discharge Medication List        CONTINUE these medications which have NOT CHANGED    Details   traMADol (ULTRAM) 50 MG tablet Take 1 tablet by mouth every 6 hours as needed for Pain.      amLODIPine (NORVASC) 5 MG tablet Take 1 tablet by mouth daily      Naproxen Sodium (ALEVE) 220 MG CAPS Take  by mouth.               ALLERGIES     Hydrocodone    FAMILYHISTORY     History reviewed. No pertinent family history.     SOCIAL HISTORY       Social History     Tobacco Use    Smoking status: Never    Smokeless tobacco: Never   Vaping Use    Vaping Use: Never used   Substance Use

## 2024-06-03 PROBLEM — E87.6 HYPOKALEMIA: Status: ACTIVE | Noted: 2024-06-03

## 2024-06-03 LAB
ANION GAP SERPL CALCULATED.3IONS-SCNC: 6 MMOL/L (ref 7–16)
BASOPHILS # BLD: 0.05 K/UL (ref 0–0.2)
BASOPHILS NFR BLD: 1 % (ref 0–2)
BUN SERPL-MCNC: 22 MG/DL (ref 6–23)
CALCIUM SERPL-MCNC: 8.6 MG/DL (ref 8.6–10.2)
CHLORIDE SERPL-SCNC: 101 MMOL/L (ref 98–107)
CHOLEST SERPL-MCNC: 111 MG/DL
CO2 SERPL-SCNC: 31 MMOL/L (ref 22–29)
CREAT SERPL-MCNC: 0.7 MG/DL (ref 0.7–1.2)
EKG ATRIAL RATE: 81 BPM
EKG ATRIAL RATE: 89 BPM
EKG P AXIS: -9 DEGREES
EKG P AXIS: 63 DEGREES
EKG P-R INTERVAL: 134 MS
EKG P-R INTERVAL: 162 MS
EKG Q-T INTERVAL: 388 MS
EKG Q-T INTERVAL: 398 MS
EKG QRS DURATION: 84 MS
EKG QRS DURATION: 86 MS
EKG QTC CALCULATION (BAZETT): 462 MS
EKG QTC CALCULATION (BAZETT): 472 MS
EKG R AXIS: -35 DEGREES
EKG R AXIS: -38 DEGREES
EKG T AXIS: -48 DEGREES
EKG T AXIS: 2 DEGREES
EKG VENTRICULAR RATE: 81 BPM
EKG VENTRICULAR RATE: 89 BPM
EOSINOPHIL # BLD: 0.43 K/UL (ref 0.05–0.5)
EOSINOPHILS RELATIVE PERCENT: 5 % (ref 0–6)
ERYTHROCYTE [DISTWIDTH] IN BLOOD BY AUTOMATED COUNT: 14.5 % (ref 11.5–15)
GFR, ESTIMATED: >90 ML/MIN/1.73M2
GLUCOSE SERPL-MCNC: 136 MG/DL (ref 74–99)
HBA1C MFR BLD: 6.4 % (ref 4–5.6)
HCT VFR BLD AUTO: 32.5 % (ref 37–54)
HDLC SERPL-MCNC: 41 MG/DL
HGB BLD-MCNC: 10.2 G/DL (ref 12.5–16.5)
IMM GRANULOCYTES # BLD AUTO: 0.07 K/UL (ref 0–0.58)
IMM GRANULOCYTES NFR BLD: 1 % (ref 0–5)
LACTATE BLDV-SCNC: 1 MMOL/L (ref 0.5–2.2)
LDLC SERPL CALC-MCNC: 44 MG/DL
LYMPHOCYTES NFR BLD: 0.89 K/UL (ref 1.5–4)
LYMPHOCYTES RELATIVE PERCENT: 10 % (ref 20–42)
MAGNESIUM SERPL-MCNC: 1.9 MG/DL (ref 1.6–2.6)
MCH RBC QN AUTO: 28.9 PG (ref 26–35)
MCHC RBC AUTO-ENTMCNC: 31.4 G/DL (ref 32–34.5)
MCV RBC AUTO: 92.1 FL (ref 80–99.9)
MICROORGANISM SPEC CULT: NO GROWTH
MONOCYTES NFR BLD: 0.83 K/UL (ref 0.1–0.95)
MONOCYTES NFR BLD: 9 % (ref 2–12)
NEUTROPHILS NFR BLD: 75 % (ref 43–80)
NEUTS SEG NFR BLD: 6.77 K/UL (ref 1.8–7.3)
PLATELET # BLD AUTO: 233 K/UL (ref 130–450)
PMV BLD AUTO: 9.8 FL (ref 7–12)
POTASSIUM SERPL-SCNC: 3.3 MMOL/L (ref 3.5–5)
RBC # BLD AUTO: 3.53 M/UL (ref 3.8–5.8)
SERVICE CMNT-IMP: NORMAL
SODIUM SERPL-SCNC: 138 MMOL/L (ref 132–146)
SPECIMEN DESCRIPTION: NORMAL
T4 FREE SERPL-MCNC: 1.1 NG/DL (ref 0.9–1.7)
TRIGL SERPL-MCNC: 128 MG/DL
TSH SERPL DL<=0.05 MIU/L-ACNC: 3.39 UIU/ML (ref 0.27–4.2)
VLDLC SERPL CALC-MCNC: 26 MG/DL
WBC OTHER # BLD: 9 K/UL (ref 4.5–11.5)

## 2024-06-03 PROCEDURE — 83735 ASSAY OF MAGNESIUM: CPT

## 2024-06-03 PROCEDURE — 2580000003 HC RX 258: Performed by: INTERNAL MEDICINE

## 2024-06-03 PROCEDURE — 99222 1ST HOSP IP/OBS MODERATE 55: CPT | Performed by: INTERNAL MEDICINE

## 2024-06-03 PROCEDURE — APPSS60 APP SPLIT SHARED TIME 46-60 MINUTES: Performed by: NURSE PRACTITIONER

## 2024-06-03 PROCEDURE — 2700000000 HC OXYGEN THERAPY PER DAY

## 2024-06-03 PROCEDURE — 2580000003 HC RX 258: Performed by: SPECIALIST

## 2024-06-03 PROCEDURE — 6370000000 HC RX 637 (ALT 250 FOR IP): Performed by: INTERNAL MEDICINE

## 2024-06-03 PROCEDURE — 85025 COMPLETE CBC W/AUTO DIFF WBC: CPT

## 2024-06-03 PROCEDURE — 6370000000 HC RX 637 (ALT 250 FOR IP): Performed by: NURSE PRACTITIONER

## 2024-06-03 PROCEDURE — 97161 PT EVAL LOW COMPLEX 20 MIN: CPT

## 2024-06-03 PROCEDURE — 97165 OT EVAL LOW COMPLEX 30 MIN: CPT

## 2024-06-03 PROCEDURE — 6360000002 HC RX W HCPCS: Performed by: SPECIALIST

## 2024-06-03 PROCEDURE — 80048 BASIC METABOLIC PNL TOTAL CA: CPT

## 2024-06-03 PROCEDURE — 80061 LIPID PANEL: CPT

## 2024-06-03 PROCEDURE — 1200000000 HC SEMI PRIVATE

## 2024-06-03 PROCEDURE — 84439 ASSAY OF FREE THYROXINE: CPT

## 2024-06-03 PROCEDURE — 93010 ELECTROCARDIOGRAM REPORT: CPT | Performed by: INTERNAL MEDICINE

## 2024-06-03 PROCEDURE — 83036 HEMOGLOBIN GLYCOSYLATED A1C: CPT

## 2024-06-03 PROCEDURE — 84443 ASSAY THYROID STIM HORMONE: CPT

## 2024-06-03 PROCEDURE — 83605 ASSAY OF LACTIC ACID: CPT

## 2024-06-03 PROCEDURE — 6360000002 HC RX W HCPCS: Performed by: INTERNAL MEDICINE

## 2024-06-03 PROCEDURE — 99233 SBSQ HOSP IP/OBS HIGH 50: CPT | Performed by: INTERNAL MEDICINE

## 2024-06-03 PROCEDURE — 93005 ELECTROCARDIOGRAM TRACING: CPT | Performed by: NURSE PRACTITIONER

## 2024-06-03 RX ORDER — POTASSIUM CHLORIDE 20 MEQ/1
40 TABLET, EXTENDED RELEASE ORAL ONCE
Status: COMPLETED | OUTPATIENT
Start: 2024-06-03 | End: 2024-06-03

## 2024-06-03 RX ORDER — CASTOR OIL AND BALSAM, PERU 788; 87 MG/G; MG/G
OINTMENT TOPICAL 2 TIMES DAILY
Status: DISCONTINUED | OUTPATIENT
Start: 2024-06-03 | End: 2024-06-07 | Stop reason: HOSPADM

## 2024-06-03 RX ADMIN — SODIUM CHLORIDE, PRESERVATIVE FREE 10 ML: 5 INJECTION INTRAVENOUS at 21:51

## 2024-06-03 RX ADMIN — AMLODIPINE BESYLATE 5 MG: 5 TABLET ORAL at 08:36

## 2024-06-03 RX ADMIN — MICONAZOLE NITRATE: 20 POWDER TOPICAL at 21:52

## 2024-06-03 RX ADMIN — ENOXAPARIN SODIUM 30 MG: 100 INJECTION SUBCUTANEOUS at 08:36

## 2024-06-03 RX ADMIN — MICONAZOLE NITRATE: 20 POWDER TOPICAL at 08:37

## 2024-06-03 RX ADMIN — WATER 2000 MG: 1 INJECTION INTRAMUSCULAR; INTRAVENOUS; SUBCUTANEOUS at 03:00

## 2024-06-03 RX ADMIN — Medication: at 10:48

## 2024-06-03 RX ADMIN — Medication: at 08:38

## 2024-06-03 RX ADMIN — MICONAZOLE NITRATE: 2 OINTMENT TOPICAL at 08:39

## 2024-06-03 RX ADMIN — SODIUM CHLORIDE: 9 INJECTION, SOLUTION INTRAVENOUS at 07:26

## 2024-06-03 RX ADMIN — Medication: at 21:50

## 2024-06-03 RX ADMIN — ENOXAPARIN SODIUM 30 MG: 100 INJECTION SUBCUTANEOUS at 21:47

## 2024-06-03 RX ADMIN — MICONAZOLE NITRATE: 2 OINTMENT TOPICAL at 21:53

## 2024-06-03 RX ADMIN — POTASSIUM CHLORIDE 40 MEQ: 1500 TABLET, EXTENDED RELEASE ORAL at 08:36

## 2024-06-03 RX ADMIN — Medication: at 17:26

## 2024-06-03 RX ADMIN — WATER 2000 MG: 1 INJECTION INTRAMUSCULAR; INTRAVENOUS; SUBCUTANEOUS at 10:44

## 2024-06-03 ASSESSMENT — PAIN DESCRIPTION - LOCATION: LOCATION: HIP

## 2024-06-03 ASSESSMENT — PAIN DESCRIPTION - ORIENTATION: ORIENTATION: LEFT

## 2024-06-03 ASSESSMENT — PAIN - FUNCTIONAL ASSESSMENT: PAIN_FUNCTIONAL_ASSESSMENT: PREVENTS OR INTERFERES SOME ACTIVE ACTIVITIES AND ADLS

## 2024-06-03 ASSESSMENT — PAIN DESCRIPTION - ONSET: ONSET: ON-GOING

## 2024-06-03 ASSESSMENT — PAIN DESCRIPTION - PAIN TYPE: TYPE: ACUTE PAIN

## 2024-06-03 ASSESSMENT — PAIN DESCRIPTION - DESCRIPTORS: DESCRIPTORS: BURNING

## 2024-06-03 ASSESSMENT — PAIN SCALES - GENERAL
PAINLEVEL_OUTOF10: 10
PAINLEVEL_OUTOF10: 0

## 2024-06-03 ASSESSMENT — PAIN DESCRIPTION - FREQUENCY: FREQUENCY: INTERMITTENT

## 2024-06-03 NOTE — PROGRESS NOTES
Patient request to that his wife and daughter Maeve are the only ones that he would like to have information and to visit. Patient requested for his chart to be made confidential.

## 2024-06-03 NOTE — PROGRESS NOTES
Returned call to pt daughterCelina - [phone] to provide update as requested.  Phone call unanswered and no voicemail set up to leave a message at this time.      8:14 AM--Updated day time primary RN, Samara of this RN's unanswered phone call to yazan Patrick for an update as requested.  Per Samara, RN call to yazan Patrick to update no longer needed; pt now requests wife and daughter Maeve are the only individuals permitted to receive any medical information.

## 2024-06-03 NOTE — CARE COORDINATION
Transition of Care-Met with patient and his daughter Maeve at the bedside, introduced myself and CM role in discharge planning. Patient lives with his wife Angela in a second floor apartment. They have an elevator in building. Patient is essentially bed bound, sacral wounds noted. He uses a walker, power scooter and hospital bed. PCP is Dr. Jean, preferred pharmacy is [facility] on [address]. Patient is currently requiring oxygen ( 4L), this is new. Patient needs placed-awaiting therapy evaluatuons. SNF list left at the bedside. CM following.\    Addendum-Call to wife-phone number is not accepting calls at this time. Went to speak to patient-he has not looked at SNF papers-educated on need and physician/therapy recommendations for rehab vs. Home. Will speak to his wife tomorrow hopefully.    ADDENDUM-Wife came to visit-fixed contact information in EPIC. [facility], second choice is [facility]. Referral sent to first facility-awaiting acceptance.       Pepper HINKLEN, RN  [facility]

## 2024-06-03 NOTE — ACP (ADVANCE CARE PLANNING)
Advance Care Planning   Healthcare Decision Maker:    Primary Decision Maker: Angela Rangel - [facility] - [phone]    Click here to complete Healthcare Decision Makers including selection of the Healthcare Decision Maker Relationship (ie \"Primary\").

## 2024-06-03 NOTE — PROGRESS NOTES
[facility] Hospitalist Progress Note    Admitting Date and Time: 6/2/2024  9:24 AM  Admit Dx: Hypokalemia [E87.6]  Elevated troponin [R79.89]  Sacral wound, initial encounter [S31.000A]  Wound of sacral region, initial encounter [S31.000A]  Severe sepsis (HCC) [A41.9, R65.20]  Respiratory failure with hypoxia, unspecified chronicity (HCC) [J96.91]    Subjective:  Patient is being followed for Hypokalemia [E87.6]  Elevated troponin [R79.89]  Sacral wound, initial encounter [S31.000A]  Wound of sacral region, initial encounter [S31.000A]  Severe sepsis (HCC) [A41.9, R65.20]  Respiratory failure with hypoxia, unspecified chronicity (HCC) [J96.91]     Spoke with case management. Patient will need placement    ROS: denies fever, chills, cp, sob, n/v, HA unless stated above.      potassium chloride  40 mEq Oral Once    amLODIPine  5 mg Oral Daily    sodium chloride flush  5-40 mL IntraVENous 2 times per day    enoxaparin  30 mg SubCUTAneous BID    miconazole   Topical BID    ceFAZolin  2,000 mg IntraVENous Q8H    ammonium lactate   Topical BID    miconazole nitrate   Topical BID     traMADol, 50 mg, Q6H PRN  sodium chloride flush, 5-40 mL, PRN  sodium chloride, , PRN  potassium chloride, 40 mEq, PRN   Or  potassium alternative oral replacement, 40 mEq, PRN   Or  potassium chloride, 10 mEq, PRN  magnesium sulfate, 2,000 mg, PRN  ondansetron, 4 mg, Q8H PRN   Or  ondansetron, 4 mg, Q6H PRN  polyethylene glycol, 17 g, Daily PRN  acetaminophen, 650 mg, Q6H PRN   Or  acetaminophen, 650 mg, Q6H PRN  perflutren lipid microspheres, 1.5 mL, ONCE PRN         Objective:    /65   Pulse 69   Temp 98.6 °F (37 °C)   Resp 18   Ht 1.803 m (5' 11\")   Wt 129.3 kg (285 lb)   SpO2 100%   BMI 39.75 kg/m²     General Appearance: alert and oriented to person, place and time and in no acute distress  Skin: warm and dry, sacral wound  Head: normocephalic and atraumatic  Eyes: pupils equal, round, extraocular eye movements

## 2024-06-03 NOTE — PROGRESS NOTES
OCCUPATIONAL THERAPY INITIAL EVALUATION    [facility]   [address] [facility] [facility]        Date:6/3/2024                                                  Patient Name: Fabio Rangel    MRN: [id number]    : 1960    Room: [address] [place] [address]    Evaluating OT: Kasandra Armenta OTR/L #[id number]    Referring Provider:  Gladys Henderson DO     Specific Provider Orders/Date:  OT Eval and Treat , 6/3/2024     Diagnosis:   1. Wound of sacral region, initial encounter    2. Respiratory failure with hypoxia, unspecified chronicity (HCC)    3. Severe sepsis (HCC)    4. Hypokalemia    5. Elevated troponin         Surgery: None       Pertinent Medical History: Muscular dystrophy, obesity    Precautions:  Fall Risk, alarm, O2 - new user      Assessment of current deficits    [x] Functional mobility  [x]ADLs  [x] Strength               []Cognition    [x] Functional transfers   [x] IADLs         [x] Safety Awareness   [x]Endurance    [x] Fine Coordination              [x] Balance      [] Vision/perception   []Sensation     []Gross Motor Coordination  [x] ROM  [] Delirium                   [] Motor Control     OT PLAN OF CARE   OT POC based on physician orders, patient diagnosis and results of clinical assessment    Frequency/Duration 1-3 days/wk for 2-4 weeks PRN     Specific OT Treatment Interventions to include:   * Instruction/training on adapted ADL techniques and AE recommendations to increase functional independence within precautions       * Training on energy conservation strategies, correct breathing pattern and techniques to improve independence/tolerance for self-care routine  * Functional transfer/mobility training/DME recommendations for increased independence, safety, and fall prevention  * Patient/Family education to increase follow through with safety techniques and functional independence  * Recommendation of environmental modifications for increased safety

## 2024-06-03 NOTE — PROGRESS NOTES
Nutrition Assessment     Type and Reason for Visit: Initial, Consult, Wound    Nutrition Recommendations/Plan:   Continue diet per orders.  Wound ONS BID to optimize nutrient intake for healing.     Nutrition Assessment:  ADM w/sacral wound, Severe sepsis  &  Respiratory failure with hypoxia  PMH: Muscular Dystrophy, Obesity. Pt asleep when visited. Appetite good per intake sheets. Added wound ONS BID to optimize intake, promote healing. Will monitor.    Nutrition Related Findings:   Alert, verbal, bed bound, distended/obese/rotund abd, active BS, +3 BLE edema, K+ 3.3 (tx'd), ,  per CT Rectal constipation, prn bowel meds Wound Type: Pressure Injury (superficial wound, no infection per ID. Coccyx, intragluteal. Soft black. Chronic skin changes moisture, friction and shear per wound nurse.)    Current Nutrition Therapies:    ADULT DIET; Regular; Low Fat/Low Chol/High Fiber/JANESSA; Low Sodium (2 gm)  ADULT ORAL NUTRITION SUPPLEMENT; Breakfast, Dinner; Wound Healing Oral Supplement    Anthropometric Measures:  Height: 180.3 cm (5' 11\")  Current Body Wt: 150 kg (330 lb 11 oz)   BMI: 46.1    Nutrition Diagnosis:   No nutrition diagnosis at this time      Nutrition Interventions:   Food and/or Nutrient Delivery: Continue Current Diet, Start Oral Nutrition Supplement  Nutrition Education/Counseling: No recommendation at this time  Coordination of Nutrition Care: Continue to monitor while inpatient       Goals:     Goals: Meet at least 75% of estimated needs, by next RD assessment       Nutrition Monitoring and Evaluation:      Food/Nutrient Intake Outcomes: Food and Nutrient Intake, Supplement Intake  Physical Signs/Symptoms Outcomes: Nutrition Focused Physical Findings, Skin, Weight, Fluid Status or Edema, Biochemical Data    Discharge Planning:    Continue current diet     Terri Merlos RD  Contact: [phone]

## 2024-06-03 NOTE — CONSULTS
Inpatient Cardiology Consultation      Reason for Consult:  NSTEMI    Consulting Physician: Dr Mckeon    Requesting Physician:  Dr Henderson    Date of Consultation: 6/3/2024    HISTORY OF PRESENT ILLNESS: 62 yo obese  male seen only as an inpatient consult by Dr Alvarado 8/20/2021.     Presented to ED for worsening sacral wound with bleeding and pain. No CP/pressure, SOB, dizziness or palpitations.     Patient sleeps in bed/lift chair. Does not wear O2. Does admit to daytime naps. Denies any history of sleep study.     [facility] 6/2/2024 BP 95/49 HR 90's SR, Afebrile, and O2 saturation 98% on RA.    Troponin 180>161>144, , , p-BNP 87, Na 134, K+ 3.2>3.3, Bun/Cr 33/1.1>22/0.7, magnesium 2.0>1.9, Lactic acid 3.9>2.5>1.0, Albumin 3.1, LFT\"s WNL, WBC 12.4>9.0, Hgb 11.5>10.2, Plt 281, BC x 2 drawn, respiratory panel including COVID-19 Negative, UA negative.    No PE on CTA chest    ED Medications: 2 liters NSS, IV Ancef.    On NSS @ 100/hr    Hypoxia throughout the day 6/2/2024> placed on 4 liters NC> O2 saturation 100%     Norvasc 5 mg QD re-ordered from home    Currently /65 HR HR 60's SR, afebrile, and O2 saturation 100% on 4 liters NC        Please note: past medical records were reviewed per electronic medical record (EMR) - see detailed reports under Past Medical/ Surgical History.   Past Medical History:    2010 Adenosine MPS: Non-ischemic. EF 55-60%. NWM.   8/19/2021 Troponin 60>66  8/20/2021 Lexiscan MPS: NWM. Non-ischemic. EF 82%. No TID.   Muscular dystrophy  Non-ambulatory  HTN  BMI 39.75 (stated weight not accurate weight) 6/2/2024  Anxiety  Denies CVA, DM, thyroid disorder, HLD, CAD, PUD, DVT/PE, or carcinoma  Probable KISHOR      Past Surgical History:  Excision tailbone cyst      Medications Prior to admit:  Prior to Admission medications    Medication Sig Start Date End Date Taking? Authorizing Provider   traMADol (ULTRAM) 50 MG tablet Take 1 tablet by mouth every 6 hours as  wound  Muscular dystrophy, non-ambulatory  HTN: controlled  Morbid obesity  Anxiety    PLAN:  TTE  Sacral wound as per primary service  Can consider a stress test as an outpatient  Further recommendations to follow    Discussed with Dr Mckeon  Electronically signed by YINKA OLEA on 6/3/2024 at 6:55 AM   ______________________________________________________________________  Cardiology attending attestation:  I have independently interviewed and examined the patient.  I personally reviewed pertinent laboratory values and diagnostic testing (including, if applicable, chest xray, electrocardiogram, telemetry, echocardiogram, stress testing, and coronary angiography).  I have reviewed the above documentation completed by TOMÁS Amin including past medical, social, and family history and agree with the findings, assessment and plan except where noted.  Plan formulated by me.  I participated in all aspects of the medical decision making and performed the substantive portion of the visit by contributing >50% of the total visit time.  Please see my additional contributions to the HPI, physical exam, and assessment / medical decision making:  _______________________________________________________________________    63-year-old male known to [facility] through inpatient evaluation only back in 2021 had a stress test at the time.  Bedbound due to muscular dystrophy.  Here for sacral wound evaluation.  For unclear reasons troponin was checked and was found to be elevated.  There were nonspecific EKG changes.  He denies chest pain or shortness of breath.    Physical Exam:  /65   Pulse 69   Temp 98.6 °F (37 °C)   Resp 18   Ht 1.803 m (5' 11\")   Wt 129.3 kg (285 lb)   SpO2 100%   BMI 39.75 kg/m²   General appearance: Awake, alert, no acute respiratory distress  Skin: Intact, no rash  ENMT: Moist mucous membranes  Neck: Supple, no carotid bruits.  Normal jugular venous pressure  Lungs: Clear to

## 2024-06-03 NOTE — CONSULTS
[facility]  [facility]    Consultation Note     Admit Date: 6/2/2024  9:24 AM    Reason for Consult:   sacral wound    Attending Physician:  Gladys Henderson DO     Chief Complaint: back wound    HISTORY OF PRESENT ILLNESS:   The patient is a 63 y.o.  male known to the Infectious Diseases service. The patient came to ER yesterday with sacral wound . Its been there for some time. He does not walk. Lives at home, his wife takes care of him. No fever or chills or abdominal pain or nausea/vomiting or diarrhea.     On presentation patient had no fever, HS stable on RA,. On and off hypoxic overnight so on 4L NC now with 100% sat. Labs showed white count of 12 improved to 9 hgb is 10.2, platelet count is 233, lfts normal. Cr is 0.7/Bun of 22. Crp is 160. Pct is negative. Blood cx in process RVP negative, UA is clean.CT did not show any significant issues. Patient is on IV Cefazolin and I got consulted for antibiotic management.    Past Medical History:        Diagnosis Date    Hypertension     MD (muscular dystrophy) ([facility])     Obesity (BMI 35.0-39.9 without comorbidity) 8/20/2021       Past Surgical History:        Procedure Laterality Date    CYST REMOVAL      on tailbone when was a child       Current Medications:   Scheduled Meds:   balsum peru-castor oil   Topical BID    amLODIPine  5 mg Oral Daily    sodium chloride flush  5-40 mL IntraVENous 2 times per day    enoxaparin  30 mg SubCUTAneous BID    miconazole   Topical BID    ceFAZolin  2,000 mg IntraVENous Q8H    ammonium lactate   Topical BID    miconazole nitrate   Topical BID     Continuous Infusions:   sodium chloride       PRN Meds:traMADol, sodium chloride flush, sodium chloride, potassium chloride **OR** potassium alternative oral replacement **OR** potassium chloride, magnesium sulfate, ondansetron **OR** ondansetron, polyethylene glycol, acetaminophen **OR** acetaminophen, perflutren lipid microspheres    Allergies:

## 2024-06-03 NOTE — PROGRESS NOTES
Physical Therapy  Facility/Department: [address] MED SURG/TELE  Physical Therapy Initial Assessment    Name: Fabio Rangel  : 1960  MRN: [id number]  Date of Service: 6/3/2024         Patient Diagnosis(es): The primary encounter diagnosis was Wound of sacral region, initial encounter. Diagnoses of Respiratory failure with hypoxia, unspecified chronicity (HCC), Severe sepsis (HCC), Hypokalemia, and Elevated troponin were also pertinent to this visit.  Past Medical History:  has a past medical history of Hypertension, MD (muscular dystrophy) (HCC), and Obesity (BMI 35.0-39.9 without comorbidity).  Past Surgical History:  has a past surgical history that includes cyst removal.      Evaluating Therapist: Luciana Ragland PT    Equipment Recommendation vivienne lift if pt discharges home (Pepper from case management notified)    Room #:  0528/0528-A  Diagnosis:  Hypokalemia [E87.6]  Elevated troponin [R79.89]  Sacral wound, initial encounter [S31.000A]  Wound of sacral region, initial encounter [S31.000A]  Severe sepsis (HCC) [A41.9, R65.20]  Respiratory failure with hypoxia, unspecified chronicity (HCC) [J96.91]  PMHx/PSHx:  HTN, muscular dystrophy  Precautions:  falls, alarm, sacral wound      Social:  Pt lives with wife, daughter and her  live in an apartment in the basement. Pt non ambulatory. Sleeps in lift chair and transfers to power chair 2x a day with assist. Pt states he stands to transfer. Pt reports that he just got a hospital bed but has not used it yet.      Initial Evaluation  Date: 6/3/24 Treatment      Short Term/ Long Term   Goals   Was pt agreeable to Eval/treatment? yes     Does pt have pain?       Bed Mobility  Rolling: dependent  Supine<> long sit dependent of 2    Scooting: dependent of 2  Max assist   Transfers Sit to stand: NT  Stand to sit: NT  Stand pivot: NT  N/A recommend vivienne lift at this time   Ambulation    NT  N/A pt non abmulatory   Stair Negotiation  Ascended and descended  NT   N/A  treatment goals are located in above grid    Frequency of treatments: 2-5x/week x 5-7 days.    Time in  0905  Time out  0920      Evaluation Time includes thorough review of current medical information, gathering information on past medical history/social history and prior level of function, completion of standardized testing/informal observation of tasks, assessment of data and education on plan of care and goals.      CPT codes:  [x] Low Complexity PT evaluation 48588  [] Moderate Complexity PT evaluation 72454  [] High Complexity PT evaluation 05072  [] PT Re-evaluation 12209  [] Gait training 87309 minutes  [] Manual therapy 04253 minutes  [] Therapeutic activities 27192 minutes  [] Therapeutic exercises 21118 minutes  [] Neuromuscular reeducation 96850 minutes     Luciana Ragland PT [id number]

## 2024-06-03 NOTE — PROGRESS NOTES
Patient family at bedside with patient. Patient only wanting his wife, and daughter Maeve to have medical information. Other two children's names removed from contact.

## 2024-06-04 LAB
ANION GAP SERPL CALCULATED.3IONS-SCNC: 5 MMOL/L (ref 7–16)
BASOPHILS # BLD: 0.05 K/UL (ref 0–0.2)
BASOPHILS NFR BLD: 1 % (ref 0–2)
BUN SERPL-MCNC: 18 MG/DL (ref 6–23)
CALCIUM SERPL-MCNC: 8.7 MG/DL (ref 8.6–10.2)
CHLORIDE SERPL-SCNC: 102 MMOL/L (ref 98–107)
CO2 SERPL-SCNC: 29 MMOL/L (ref 22–29)
CREAT SERPL-MCNC: 0.6 MG/DL (ref 0.7–1.2)
EOSINOPHIL # BLD: 0.43 K/UL (ref 0.05–0.5)
EOSINOPHILS RELATIVE PERCENT: 5 % (ref 0–6)
ERYTHROCYTE [DISTWIDTH] IN BLOOD BY AUTOMATED COUNT: 14.1 % (ref 11.5–15)
GFR, ESTIMATED: >90 ML/MIN/1.73M2
GLUCOSE SERPL-MCNC: 128 MG/DL (ref 74–99)
HCT VFR BLD AUTO: 35.7 % (ref 37–54)
HGB BLD-MCNC: 11 G/DL (ref 12.5–16.5)
IMM GRANULOCYTES # BLD AUTO: 0.08 K/UL (ref 0–0.58)
IMM GRANULOCYTES NFR BLD: 1 % (ref 0–5)
LYMPHOCYTES NFR BLD: 1.18 K/UL (ref 1.5–4)
LYMPHOCYTES RELATIVE PERCENT: 14 % (ref 20–42)
MCH RBC QN AUTO: 29.6 PG (ref 26–35)
MCHC RBC AUTO-ENTMCNC: 30.8 G/DL (ref 32–34.5)
MCV RBC AUTO: 96 FL (ref 80–99.9)
MONOCYTES NFR BLD: 0.85 K/UL (ref 0.1–0.95)
MONOCYTES NFR BLD: 10 % (ref 2–12)
NEUTROPHILS NFR BLD: 70 % (ref 43–80)
NEUTS SEG NFR BLD: 5.88 K/UL (ref 1.8–7.3)
PLATELET # BLD AUTO: 252 K/UL (ref 130–450)
PMV BLD AUTO: 9.8 FL (ref 7–12)
POTASSIUM SERPL-SCNC: 4 MMOL/L (ref 3.5–5)
RBC # BLD AUTO: 3.72 M/UL (ref 3.8–5.8)
SODIUM SERPL-SCNC: 136 MMOL/L (ref 132–146)
WBC OTHER # BLD: 8.5 K/UL (ref 4.5–11.5)

## 2024-06-04 PROCEDURE — 1200000000 HC SEMI PRIVATE

## 2024-06-04 PROCEDURE — 6370000000 HC RX 637 (ALT 250 FOR IP): Performed by: INTERNAL MEDICINE

## 2024-06-04 PROCEDURE — 99233 SBSQ HOSP IP/OBS HIGH 50: CPT | Performed by: INTERNAL MEDICINE

## 2024-06-04 PROCEDURE — 2700000000 HC OXYGEN THERAPY PER DAY

## 2024-06-04 PROCEDURE — 2580000003 HC RX 258: Performed by: INTERNAL MEDICINE

## 2024-06-04 PROCEDURE — 6360000002 HC RX W HCPCS: Performed by: INTERNAL MEDICINE

## 2024-06-04 PROCEDURE — 99232 SBSQ HOSP IP/OBS MODERATE 35: CPT | Performed by: INTERNAL MEDICINE

## 2024-06-04 PROCEDURE — 80048 BASIC METABOLIC PNL TOTAL CA: CPT

## 2024-06-04 PROCEDURE — 85025 COMPLETE CBC W/AUTO DIFF WBC: CPT

## 2024-06-04 RX ADMIN — AMLODIPINE BESYLATE 5 MG: 5 TABLET ORAL at 09:32

## 2024-06-04 RX ADMIN — MICONAZOLE NITRATE: 2 OINTMENT TOPICAL at 21:43

## 2024-06-04 RX ADMIN — SODIUM CHLORIDE, PRESERVATIVE FREE 10 ML: 5 INJECTION INTRAVENOUS at 21:33

## 2024-06-04 RX ADMIN — ENOXAPARIN SODIUM 30 MG: 100 INJECTION SUBCUTANEOUS at 21:32

## 2024-06-04 RX ADMIN — Medication: at 21:43

## 2024-06-04 RX ADMIN — MICONAZOLE NITRATE: 20 POWDER TOPICAL at 21:44

## 2024-06-04 RX ADMIN — Medication: at 09:33

## 2024-06-04 RX ADMIN — Medication: at 09:32

## 2024-06-04 RX ADMIN — ENOXAPARIN SODIUM 30 MG: 100 INJECTION SUBCUTANEOUS at 09:32

## 2024-06-04 RX ADMIN — MICONAZOLE NITRATE: 20 POWDER TOPICAL at 09:33

## 2024-06-04 RX ADMIN — MICONAZOLE NITRATE: 2 OINTMENT TOPICAL at 09:33

## 2024-06-04 RX ADMIN — Medication: at 17:16

## 2024-06-04 RX ADMIN — SODIUM CHLORIDE, PRESERVATIVE FREE 10 ML: 5 INJECTION INTRAVENOUS at 09:34

## 2024-06-04 NOTE — CARE COORDINATION
Transition of Care-[facility] cannot accept-however [facility] can. Requested pre-cert be submitted today. LETICIA, transportation forms and HENS complete.     Pepper HINKLEN, RN  [facility]

## 2024-06-04 NOTE — PROGRESS NOTES
INPATIENT CARDIOLOGY FOLLOW-UP    Name: Fabio Rangel    Age: 63 y.o.    Date of Admission: 6/2/2024  9:24 AM    Date of Service: 6/4/2024    Primary Cardiologist: None, known to [facility] through inpatient evaluation only seen by Dr. Alvarado in 2021    Chief Complaint: Follow-up for elevated troponin    Interim History:  Denies chest pain or shortness of breath.    Review of Systems:   Negative except as described above    Problem List:  Patient Active Problem List   Diagnosis    Weakness    Bilateral foot-drop    Acquired bilateral genu recurvatum    Elevated troponin    Altered mental status    MD (muscular dystrophy) ([facility])    Hypertension    Severe obesity (BMI 35.0-35.9 with comorbidity) ([facility])    Encounter for psychiatric assessment    Sacral wound    Hypokalemia       Current Medications:    Current Facility-Administered Medications:     perflutren lipid microspheres (DEFINITY) injection 1.5 mL, 1.5 mL, IntraVENous, ONCE PRN, Noa Mcgee, MELISSAN    balsum peru-castor oil (VENELEX) ointment, , Topical, BID, Gladys Henderson DO, Given at 06/04/24 0933    amLODIPine (NORVASC) tablet 5 mg, 5 mg, Oral, Daily, Gladys Henderson DO, 5 mg at 06/04/24 0932    traMADol (ULTRAM) tablet 50 mg, 50 mg, Oral, Q6H PRN, Gladys Henderson DO    sodium chloride flush 0.9 % injection 5-40 mL, 5-40 mL, IntraVENous, 2 times per day, Gladys Henderson DO, 10 mL at 06/04/24 0934    sodium chloride flush 0.9 % injection 5-40 mL, 5-40 mL, IntraVENous, PRN, Gladys Henderson DO    0.9 % sodium chloride infusion, , IntraVENous, PRN, Gladys Henderson DO    potassium chloride (KLOR-CON M) extended release tablet 40 mEq, 40 mEq, Oral, PRN **OR** potassium bicarb-citric acid (EFFER-K) effervescent tablet 40 mEq, 40 mEq, Oral, PRN **OR** potassium chloride 10 mEq/100 mL IVPB (Peripheral Line), 10 mEq, IntraVENous, PRN, Gladys Henderson DO, Stopped at 06/02/24 1934    magnesium sulfate 2000 mg in 50 mL IVPB premix, 2,000 mg, IntraVENous, PRN,  diabetes A1c 6.4%  Severe obesity BMI 47 kg/m²    RECOMMENDATIONS:  Per ID they do not feel sacral wound is infected.  He does have significantly elevated inflammatory markers.  He has no anginal symptoms and no ischemic EKG changes.    Echo pending  Consider eventual ischemic evaluation  Aggressive risk factor modification  Further care per primary service and consultants    Hao Mckeon MD, [facility]    NOTE: This report was transcribed using voice recognition software. Every effort was made to ensure accuracy; however, inadvertent computerized transcription errors may be present.

## 2024-06-04 NOTE — PROGRESS NOTES
[facility] Hospitalist Progress Note    Admitting Date and Time: 6/2/2024  9:24 AM  Admit Dx: Hypokalemia [E87.6]  Elevated troponin [R79.89]  Sacral wound, initial encounter [S31.000A]  Wound of sacral region, initial encounter [S31.000A]  Severe sepsis (HCC) [A41.9, R65.20]  Respiratory failure with hypoxia, unspecified chronicity (HCC) [J96.91]    Subjective:  Patient is being followed for Hypokalemia [E87.6]  Elevated troponin [R79.89]  Sacral wound, initial encounter [S31.000A]  Wound of sacral region, initial encounter [S31.000A]  Severe sepsis (HCC) [A41.9, R65.20]  Respiratory failure with hypoxia, unspecified chronicity (HCC) [J96.91]     ROS: denies fever, chills, cp, sob, n/v, HA unless stated above.      balsum peru-castor oil   Topical BID    amLODIPine  5 mg Oral Daily    sodium chloride flush  5-40 mL IntraVENous 2 times per day    enoxaparin  30 mg SubCUTAneous BID    miconazole   Topical BID    ammonium lactate   Topical BID    miconazole nitrate   Topical BID     traMADol, 50 mg, Q6H PRN  sodium chloride flush, 5-40 mL, PRN  sodium chloride, , PRN  potassium chloride, 40 mEq, PRN   Or  potassium alternative oral replacement, 40 mEq, PRN   Or  potassium chloride, 10 mEq, PRN  magnesium sulfate, 2,000 mg, PRN  ondansetron, 4 mg, Q8H PRN   Or  ondansetron, 4 mg, Q6H PRN  polyethylene glycol, 17 g, Daily PRN  acetaminophen, 650 mg, Q6H PRN   Or  acetaminophen, 650 mg, Q6H PRN  perflutren lipid microspheres, 1.5 mL, ONCE PRN         Objective:    BP (!) 137/55   Pulse 84   Temp 98.1 °F (36.7 °C)   Resp 20   Ht 1.803 m (5' 11\")   Wt (!) 153.1 kg (337 lb 8.4 oz)   SpO2 97%   BMI 47.08 kg/m²     General Appearance: alert and oriented to person, place and time and in no acute distress  Skin: warm and dry  Head: normocephalic and atraumatic  Eyes: pupils equal, round, extraocular eye movements intact, conjunctivae normal  Pulmonary/Chest: clear to auscultation bilaterally- no wheezes, rales

## 2024-06-04 NOTE — DISCHARGE INSTR - COC
Continuity of Care Form    Patient Name: Fabio Rangel   :  1960  MRN:  [id number]    Admit date:  2024  Discharge date:  24    Code Status Order: Full Code   Advance Directives:     Admitting Physician:  Gladys Henderson DO  PCP: Doris Martinez MD    Discharging Nurse: Jose Castañeda   Discharging Hospital Unit/Room#: 0528/0528-A  Discharging Unit Phone Number: [phone]    Emergency Contact:   Extended Emergency Contact Information  Primary Emergency Contact: Angela Rangel  Address: [address] [place] [address]  Mobile Phone: [phone]  Relation: Spouse  Secondary Emergency Contact: Maeve Rangel  Mobile Phone: [phone]  Relation: Child    Past Surgical History:  Past Surgical History:   Procedure Laterality Date    CYST REMOVAL      on tailbone when was a child       Immunization History:     There is no immunization history on file for this patient.    Active Problems:  Patient Active Problem List   Diagnosis Code    Weakness R53.1    Bilateral foot-drop M21.371, M21.372    Acquired bilateral genu recurvatum M21.861, M21.862    Elevated troponin R79.89    Altered mental status R41.82    MD (muscular dystrophy) ([facility]) G71.00    Hypertension I10    Severe obesity (BMI 35.0-35.9 with comorbidity) ([facility]) E66.01, Z68.35    Encounter for psychiatric assessment Z76.89    Sacral wound S31.000A    Hypokalemia E87.6       Isolation/Infection:   Isolation            No Isolation          Patient Infection Status       None to display                     Nurse Assessment:  Last Vital Signs: BP (!) 137/55   Pulse 84   Temp 98.1 °F (36.7 °C)   Resp 20   Ht 1.803 m (5' 11\")   Wt (!) 153.1 kg (337 lb 8.4 oz)   SpO2 97%   BMI 47.08 kg/m²     Last documented pain score (0-10 scale): Pain Level: 0  Last Weight:   Wt Readings from Last 1 Encounters:   24 (!) 153.1 kg (337 lb 8.4 oz)     Mental Status:  oriented, alert, coherent, and logical    IV

## 2024-06-05 ENCOUNTER — APPOINTMENT (OUTPATIENT)
Age: 64
End: 2024-06-05
Payer: MEDICARE

## 2024-06-05 LAB
ANION GAP SERPL CALCULATED.3IONS-SCNC: 6 MMOL/L (ref 7–16)
BASOPHILS # BLD: 0.06 K/UL (ref 0–0.2)
BASOPHILS NFR BLD: 1 % (ref 0–2)
BUN SERPL-MCNC: 11 MG/DL (ref 6–23)
CALCIUM SERPL-MCNC: 8.6 MG/DL (ref 8.6–10.2)
CHLORIDE SERPL-SCNC: 102 MMOL/L (ref 98–107)
CO2 SERPL-SCNC: 32 MMOL/L (ref 22–29)
CREAT SERPL-MCNC: 0.5 MG/DL (ref 0.7–1.2)
ECHO AO ASC DIAM: 3.5 CM
ECHO AO ASCENDING AORTA INDEX: 1.33 CM/M2
ECHO AO ROOT DIAM: 3.7 CM
ECHO AO ROOT INDEX: 1.4 CM/M2
ECHO AV AREA PEAK VELOCITY: 2.2 CM2
ECHO AV AREA VTI: 2.4 CM2
ECHO AV AREA/BSA PEAK VELOCITY: 0.8 CM2/M2
ECHO AV AREA/BSA VTI: 0.9 CM2/M2
ECHO AV CUSP MM: 2.2 CM
ECHO AV MEAN GRADIENT: 6 MMHG
ECHO AV MEAN VELOCITY: 1.2 M/S
ECHO AV PEAK GRADIENT: 10 MMHG
ECHO AV PEAK VELOCITY: 1.6 M/S
ECHO AV VELOCITY RATIO: 0.63
ECHO AV VTI: 28.4 CM
ECHO BSA: 2.74 M2
ECHO LA DIAMETER INDEX: 1.4 CM/M2
ECHO LA DIAMETER: 3.7 CM
ECHO LA TO AORTIC ROOT RATIO: 1
ECHO LA VOL A-L A4C: 59 ML (ref 18–58)
ECHO LA VOL MOD A4C: 54 ML (ref 18–58)
ECHO LA VOLUME INDEX A-L A4C: 22 ML/M2 (ref 16–34)
ECHO LA VOLUME INDEX MOD A4C: 20 ML/M2 (ref 16–34)
ECHO LV E' LATERAL VELOCITY: 8 CM/S
ECHO LV E' SEPTAL VELOCITY: 7 CM/S
ECHO LV EDV A2C: 42 ML
ECHO LV EDV A4C: 69 ML
ECHO LV EDV BP: 57 ML (ref 67–155)
ECHO LV EDV INDEX A4C: 26 ML/M2
ECHO LV EDV INDEX BP: 22 ML/M2
ECHO LV EDV NDEX A2C: 16 ML/M2
ECHO LV EJECTION FRACTION A2C: 74 %
ECHO LV EJECTION FRACTION A4C: 75 %
ECHO LV EJECTION FRACTION BIPLANE: 73 % (ref 55–100)
ECHO LV ESV A2C: 11 ML
ECHO LV ESV A4C: 17 ML
ECHO LV ESV BP: 15 ML (ref 22–58)
ECHO LV ESV INDEX A2C: 4 ML/M2
ECHO LV ESV INDEX A4C: 6 ML/M2
ECHO LV ESV INDEX BP: 6 ML/M2
ECHO LV FRACTIONAL SHORTENING: 29 % (ref 28–44)
ECHO LV INTERNAL DIMENSION DIASTOLE INDEX: 1.7 CM/M2
ECHO LV INTERNAL DIMENSION DIASTOLIC: 4.5 CM (ref 4.2–5.9)
ECHO LV INTERNAL DIMENSION SYSTOLIC INDEX: 1.21 CM/M2
ECHO LV INTERNAL DIMENSION SYSTOLIC: 3.2 CM
ECHO LV ISOVOLUMETRIC RELAXATION TIME (IVRT): 79.6 MS
ECHO LV IVSD: 1.6 CM (ref 0.6–1)
ECHO LV IVSS: 1.7 CM
ECHO LV MASS 2D: 275.8 G (ref 88–224)
ECHO LV MASS INDEX 2D: 104.5 G/M2 (ref 49–115)
ECHO LV POSTERIOR WALL DIASTOLIC: 1.4 CM (ref 0.6–1)
ECHO LV POSTERIOR WALL SYSTOLIC: 1.8 CM
ECHO LV RELATIVE WALL THICKNESS RATIO: 0.62
ECHO LVOT AREA: 3.5 CM2
ECHO LVOT AV VTI INDEX: 0.68
ECHO LVOT DIAM: 2.1 CM
ECHO LVOT MEAN GRADIENT: 2 MMHG
ECHO LVOT PEAK GRADIENT: 4 MMHG
ECHO LVOT PEAK VELOCITY: 1 M/S
ECHO LVOT STROKE VOLUME INDEX: 25.2 ML/M2
ECHO LVOT SV: 66.5 ML
ECHO LVOT VTI: 19.2 CM
ECHO MV "A" WAVE DURATION: 106.1 MSEC
ECHO MV A VELOCITY: 0.64 M/S
ECHO MV AREA PHT: 3.1 CM2
ECHO MV AREA VTI: 3.7 CM2
ECHO MV E DECELERATION TIME (DT): 240.6 MS
ECHO MV E VELOCITY: 0.56 M/S
ECHO MV E/A RATIO: 0.88
ECHO MV E/E' LATERAL: 7
ECHO MV E/E' RATIO (AVERAGED): 7.5
ECHO MV E/E' SEPTAL: 8
ECHO MV LVOT VTI INDEX: 0.95
ECHO MV MAX VELOCITY: 0.8 M/S
ECHO MV MEAN GRADIENT: 1 MMHG
ECHO MV MEAN VELOCITY: 0.5 M/S
ECHO MV PEAK GRADIENT: 3 MMHG
ECHO MV PRESSURE HALF TIME (PHT): 70 MS
ECHO MV VTI: 18.2 CM
ECHO PV MAX VELOCITY: 1.1 M/S
ECHO PV MEAN GRADIENT: 3 MMHG
ECHO PV MEAN VELOCITY: 0.8 M/S
ECHO PV PEAK GRADIENT: 5 MMHG
ECHO PV VTI: 18.1 CM
ECHO RV INTERNAL DIMENSION: 3.6 CM
ECHO RV TAPSE: 2.3 CM (ref 1.7–?)
EOSINOPHIL # BLD: 0.5 K/UL (ref 0.05–0.5)
EOSINOPHILS RELATIVE PERCENT: 7 % (ref 0–6)
ERYTHROCYTE [DISTWIDTH] IN BLOOD BY AUTOMATED COUNT: 14 % (ref 11.5–15)
GFR, ESTIMATED: >90 ML/MIN/1.73M2
GLUCOSE SERPL-MCNC: 134 MG/DL (ref 74–99)
HCT VFR BLD AUTO: 32.8 % (ref 37–54)
HGB BLD-MCNC: 10.2 G/DL (ref 12.5–16.5)
IMM GRANULOCYTES # BLD AUTO: 0.09 K/UL (ref 0–0.58)
IMM GRANULOCYTES NFR BLD: 1 % (ref 0–5)
LYMPHOCYTES NFR BLD: 1.04 K/UL (ref 1.5–4)
LYMPHOCYTES RELATIVE PERCENT: 15 % (ref 20–42)
MCH RBC QN AUTO: 29.1 PG (ref 26–35)
MCHC RBC AUTO-ENTMCNC: 31.1 G/DL (ref 32–34.5)
MCV RBC AUTO: 93.4 FL (ref 80–99.9)
MONOCYTES NFR BLD: 0.64 K/UL (ref 0.1–0.95)
MONOCYTES NFR BLD: 9 % (ref 2–12)
NEUTROPHILS NFR BLD: 66 % (ref 43–80)
NEUTS SEG NFR BLD: 4.57 K/UL (ref 1.8–7.3)
PLATELET # BLD AUTO: 267 K/UL (ref 130–450)
PMV BLD AUTO: 9.3 FL (ref 7–12)
POTASSIUM SERPL-SCNC: 3.8 MMOL/L (ref 3.5–5)
RBC # BLD AUTO: 3.51 M/UL (ref 3.8–5.8)
SODIUM SERPL-SCNC: 140 MMOL/L (ref 132–146)
WBC OTHER # BLD: 6.9 K/UL (ref 4.5–11.5)

## 2024-06-05 PROCEDURE — 1200000000 HC SEMI PRIVATE

## 2024-06-05 PROCEDURE — 99233 SBSQ HOSP IP/OBS HIGH 50: CPT | Performed by: INTERNAL MEDICINE

## 2024-06-05 PROCEDURE — 6370000000 HC RX 637 (ALT 250 FOR IP): Performed by: INTERNAL MEDICINE

## 2024-06-05 PROCEDURE — 99232 SBSQ HOSP IP/OBS MODERATE 35: CPT | Performed by: INTERNAL MEDICINE

## 2024-06-05 PROCEDURE — 6360000002 HC RX W HCPCS: Performed by: INTERNAL MEDICINE

## 2024-06-05 PROCEDURE — 93306 TTE W/DOPPLER COMPLETE: CPT | Performed by: INTERNAL MEDICINE

## 2024-06-05 PROCEDURE — 2580000003 HC RX 258: Performed by: INTERNAL MEDICINE

## 2024-06-05 PROCEDURE — 80048 BASIC METABOLIC PNL TOTAL CA: CPT

## 2024-06-05 PROCEDURE — 6360000004 HC RX CONTRAST MEDICATION: Performed by: INTERNAL MEDICINE

## 2024-06-05 PROCEDURE — 2700000000 HC OXYGEN THERAPY PER DAY

## 2024-06-05 PROCEDURE — 85025 COMPLETE CBC W/AUTO DIFF WBC: CPT

## 2024-06-05 PROCEDURE — 36415 COLL VENOUS BLD VENIPUNCTURE: CPT

## 2024-06-05 PROCEDURE — C8929 TTE W OR WO FOL WCON,DOPPLER: HCPCS

## 2024-06-05 RX ORDER — ENOXAPARIN SODIUM 100 MG/ML
40 INJECTION SUBCUTANEOUS 2 TIMES DAILY
Status: DISCONTINUED | OUTPATIENT
Start: 2024-06-05 | End: 2024-06-07 | Stop reason: HOSPADM

## 2024-06-05 RX ADMIN — AMLODIPINE BESYLATE 5 MG: 5 TABLET ORAL at 10:04

## 2024-06-05 RX ADMIN — MICONAZOLE NITRATE: 20 POWDER TOPICAL at 10:05

## 2024-06-05 RX ADMIN — ENOXAPARIN SODIUM 40 MG: 100 INJECTION SUBCUTANEOUS at 10:04

## 2024-06-05 RX ADMIN — Medication: at 19:05

## 2024-06-05 RX ADMIN — MICONAZOLE NITRATE: 2 OINTMENT TOPICAL at 10:06

## 2024-06-05 RX ADMIN — Medication: at 10:05

## 2024-06-05 RX ADMIN — SODIUM CHLORIDE, PRESERVATIVE FREE 10 ML: 5 INJECTION INTRAVENOUS at 10:07

## 2024-06-05 RX ADMIN — PERFLUTREN 1.5 ML: 6.52 INJECTION, SUSPENSION INTRAVENOUS at 08:50

## 2024-06-05 RX ADMIN — SODIUM CHLORIDE, PRESERVATIVE FREE 10 ML: 5 INJECTION INTRAVENOUS at 20:49

## 2024-06-05 RX ADMIN — ENOXAPARIN SODIUM 40 MG: 100 INJECTION SUBCUTANEOUS at 20:49

## 2024-06-05 NOTE — PROGRESS NOTES
Spoke with Desi in echo, will do echo portable    Electronically signed by Kennedi Arevalo RN on 6/5/2024 at 8:04 AM

## 2024-06-05 NOTE — PROGRESS NOTES
DVT Prophylaxis Adjustment Policy (DVT Prophylaxis)     This patient is on DVT Prophylaxis medication that requires a dose adjustment      Date Body Weight IBW  Adjusted BW SCr  CrCl Dialysis status   6/5/2024 (!) 154.3 kg (340 lb 2.7 oz) Ideal body weight: 75.3 kg (166 lb 0.1 oz)  Adjusted ideal body weight: 106.9 kg (235 lb 10.7 oz) Serum creatinine: 0.5 mg/dL (L) 06/05/24 0543  Estimated creatinine clearance: 229 mL/min (A) N/a       Pharmacy has dose-adjusted the DVT Prophylaxis regimen to match   the recommendations from the following table        Ordered Medication:Lovenox 30mg BID    Order Changed/converted to: Lovenox 40mg BID      These changes were made per protocol according to the [facility] Pharmacist   Review for Appropriate Use and Automatic Dose Adjustments of   Subcutaneous Anticoagulants Policy     *Please note this dose may need readjusted if patient's condition changes.    Please contact pharmacy with any questions regarding these changes.    Lesa Moy RPH  6/5/2024  7:25 AM

## 2024-06-05 NOTE — PROGRESS NOTES
[facility] Hospitalist Progress Note    Admitting Date and Time: 6/2/2024  9:24 AM  Admit Dx: Hypokalemia [E87.6]  Elevated troponin [R79.89]  Sacral wound, initial encounter [S31.000A]  Wound of sacral region, initial encounter [S31.000A]  Severe sepsis (HCC) [A41.9, R65.20]  Respiratory failure with hypoxia, unspecified chronicity (HCC) [J96.91]    Subjective:  Patient is being followed for Hypokalemia [E87.6]  Elevated troponin [R79.89]  Sacral wound, initial encounter [S31.000A]  Wound of sacral region, initial encounter [S31.000A]  Severe sepsis (HCC) [A41.9, R65.20]  Respiratory failure with hypoxia, unspecified chronicity (HCC) [J96.91]     No acute events overnight    ROS: denies fever, chills, cp, sob, n/v, HA unless stated above.      enoxaparin  40 mg SubCUTAneous BID    balsum peru-castor oil   Topical BID    amLODIPine  5 mg Oral Daily    sodium chloride flush  5-40 mL IntraVENous 2 times per day    miconazole   Topical BID    ammonium lactate   Topical BID    miconazole nitrate   Topical BID     perflutren lipid microspheres, 1.5 mL, ONCE PRN  traMADol, 50 mg, Q6H PRN  sodium chloride flush, 5-40 mL, PRN  sodium chloride, , PRN  potassium chloride, 40 mEq, PRN   Or  potassium alternative oral replacement, 40 mEq, PRN   Or  potassium chloride, 10 mEq, PRN  magnesium sulfate, 2,000 mg, PRN  ondansetron, 4 mg, Q8H PRN   Or  ondansetron, 4 mg, Q6H PRN  polyethylene glycol, 17 g, Daily PRN  acetaminophen, 650 mg, Q6H PRN   Or  acetaminophen, 650 mg, Q6H PRN         Objective:    BP (!) 112/59   Pulse 81   Temp 98.1 °F (36.7 °C) (Oral)   Resp 18   Ht 1.803 m (5' 11\")   Wt (!) 154.3 kg (340 lb 2.7 oz)   SpO2 99%   BMI 47.44 kg/m²     General Appearance: alert and oriented to person, place and time and in no acute distress  Skin: warm and dry  Head: normocephalic and atraumatic  Eyes: pupils equal, round, extraocular eye movements intact, conjunctivae normal  Pulmonary/Chest: clear to

## 2024-06-05 NOTE — PROGRESS NOTES
INPATIENT CARDIOLOGY FOLLOW-UP    Name: Fabio Rangel    Age: 63 y.o.    Date of Admission: 6/2/2024  9:24 AM    Date of Service: 6/4/2024    Primary Cardiologist: None, known to [facility] through inpatient evaluation only seen by Dr. Alvarado in 2021    Chief Complaint: Follow-up for elevated troponin    Interim History:  Denies chest pain or shortness of breath.  Echo being done at the bedside was of limited quality but overall seem to show preserved EF.  No clear wall motion abnormalities.    Review of Systems:   Negative except as described above    Problem List:  Patient Active Problem List   Diagnosis    Weakness    Bilateral foot-drop    Acquired bilateral genu recurvatum    Elevated troponin    Altered mental status    MD (muscular dystrophy) ([facility])    Hypertension    Severe obesity (BMI 35.0-35.9 with comorbidity) ([facility])    Encounter for psychiatric assessment    Sacral wound    Hypokalemia       Current Medications:    Current Facility-Administered Medications:     perflutren lipid microspheres (DEFINITY) injection 1.5 mL, 1.5 mL, IntraVENous, ONCE PRN, Noa Mcgee, YINKA    balsum peru-castor oil (VENELEX) ointment, , Topical, BID, Gladys Henderson DO, Given at 06/04/24 0933    amLODIPine (NORVASC) tablet 5 mg, 5 mg, Oral, Daily, Gladys Henderson DO, 5 mg at 06/04/24 0932    traMADol (ULTRAM) tablet 50 mg, 50 mg, Oral, Q6H PRN, Gladys Henderson DO    sodium chloride flush 0.9 % injection 5-40 mL, 5-40 mL, IntraVENous, 2 times per day, Gladys Henderson DO, 10 mL at 06/04/24 0934    sodium chloride flush 0.9 % injection 5-40 mL, 5-40 mL, IntraVENous, PRN, Gladys Henderson DO    0.9 % sodium chloride infusion, , IntraVENous, PRN, Gladys Henderson DO    potassium chloride (KLOR-CON M) extended release tablet 40 mEq, 40 mEq, Oral, PRN **OR** potassium bicarb-citric acid (EFFER-K) effervescent tablet 40 mEq, 40 mEq, Oral, PRN **OR** potassium chloride 10 mEq/100 mL IVPB (Peripheral Line), 10 mEq, IntraVENous,

## 2024-06-05 NOTE — CARE COORDINATION
Transition of Care-Family adamant that they want [facility]-patient denied d/t weight limitations. Next choice was [facility]-referral called, reviewing. [facility] updated-pre-cert pulled. Family aware that this is an one time change-only . Asked therapy to update in order to submit pre-cert.    Pepper HANNAH, RN  [facility]

## 2024-06-06 LAB
B.E.: 8.7 MMOL/L (ref -3–3)
COHB: 0.7 % (ref 0–1.5)
CRITICAL: ABNORMAL
DATE ANALYZED: ABNORMAL
DATE OF COLLECTION: ABNORMAL
HCO3: 33.4 MMOL/L (ref 22–26)
HHB: 7.8 % (ref 0–5)
LAB: ABNORMAL
Lab: 1425
METHB: 0.3 % (ref 0–1.5)
MODE: ABNORMAL
O2 CONTENT: 15.4 ML/DL
O2 SATURATION: 92.1 % (ref 92–98.5)
O2HB: 91.2 % (ref 94–97)
OPERATOR ID: 1023
PATIENT TEMP: 37 C
PCO2: 46.3 MMHG (ref 35–45)
PH BLOOD GAS: 7.48 (ref 7.35–7.45)
PO2: 60.8 MMHG (ref 75–100)
SOURCE, BLOOD GAS: ABNORMAL
THB: 12 G/DL (ref 11.5–16.5)
TIME ANALYZED: 1430

## 2024-06-06 PROCEDURE — 6360000002 HC RX W HCPCS: Performed by: INTERNAL MEDICINE

## 2024-06-06 PROCEDURE — 6370000000 HC RX 637 (ALT 250 FOR IP): Performed by: INTERNAL MEDICINE

## 2024-06-06 PROCEDURE — 99232 SBSQ HOSP IP/OBS MODERATE 35: CPT | Performed by: INTERNAL MEDICINE

## 2024-06-06 PROCEDURE — 2700000000 HC OXYGEN THERAPY PER DAY

## 2024-06-06 PROCEDURE — 99233 SBSQ HOSP IP/OBS HIGH 50: CPT | Performed by: STUDENT IN AN ORGANIZED HEALTH CARE EDUCATION/TRAINING PROGRAM

## 2024-06-06 PROCEDURE — 82805 BLOOD GASES W/O2 SATURATION: CPT

## 2024-06-06 PROCEDURE — 2580000003 HC RX 258: Performed by: INTERNAL MEDICINE

## 2024-06-06 PROCEDURE — 1200000000 HC SEMI PRIVATE

## 2024-06-06 RX ADMIN — Medication: at 08:50

## 2024-06-06 RX ADMIN — SODIUM CHLORIDE, PRESERVATIVE FREE 10 ML: 5 INJECTION INTRAVENOUS at 08:52

## 2024-06-06 RX ADMIN — MICONAZOLE NITRATE: 2 OINTMENT TOPICAL at 20:00

## 2024-06-06 RX ADMIN — SODIUM CHLORIDE, PRESERVATIVE FREE 10 ML: 5 INJECTION INTRAVENOUS at 20:01

## 2024-06-06 RX ADMIN — MICONAZOLE NITRATE: 2 OINTMENT TOPICAL at 08:51

## 2024-06-06 RX ADMIN — Medication: at 19:59

## 2024-06-06 RX ADMIN — MICONAZOLE NITRATE: 20 POWDER TOPICAL at 01:47

## 2024-06-06 RX ADMIN — MICONAZOLE NITRATE: 2 OINTMENT TOPICAL at 01:48

## 2024-06-06 RX ADMIN — ENOXAPARIN SODIUM 40 MG: 100 INJECTION SUBCUTANEOUS at 19:58

## 2024-06-06 RX ADMIN — ENOXAPARIN SODIUM 40 MG: 100 INJECTION SUBCUTANEOUS at 08:49

## 2024-06-06 RX ADMIN — Medication: at 01:46

## 2024-06-06 RX ADMIN — MICONAZOLE NITRATE: 20 POWDER TOPICAL at 08:50

## 2024-06-06 RX ADMIN — MICONAZOLE NITRATE: 20 POWDER TOPICAL at 19:59

## 2024-06-06 RX ADMIN — Medication: at 08:51

## 2024-06-06 RX ADMIN — AMLODIPINE BESYLATE 5 MG: 5 TABLET ORAL at 08:49

## 2024-06-06 ASSESSMENT — PAIN SCALES - GENERAL: PAINLEVEL_OUTOF10: 0

## 2024-06-06 NOTE — PROGRESS NOTES
[facility] Hospitalist Progress Note    Admitting Date and Time: 6/2/2024  9:24 AM  Admit Dx: Hypokalemia [E87.6]  Elevated troponin [R79.89]  Sacral wound, initial encounter [S31.000A]  Wound of sacral region, initial encounter [S31.000A]  Severe sepsis (HCC) [A41.9, R65.20]  Respiratory failure with hypoxia, unspecified chronicity (HCC) [J96.91]    Subjective:  Patient is being followed for Hypokalemia [E87.6]  Elevated troponin [R79.89]  Sacral wound, initial encounter [S31.000A]  Wound of sacral region, initial encounter [S31.000A]  Severe sepsis (HCC) [A41.9, R65.20]  Respiratory failure with hypoxia, unspecified chronicity (HCC) [J96.91]     Patient was seen exam.   ROS: denies fever, chills, cp, sob, n/v, HA unless stated above.      enoxaparin  40 mg SubCUTAneous BID    balsum peru-castor oil   Topical BID    amLODIPine  5 mg Oral Daily    sodium chloride flush  5-40 mL IntraVENous 2 times per day    miconazole   Topical BID    ammonium lactate   Topical BID    miconazole nitrate   Topical BID     perflutren lipid microspheres, 1.5 mL, ONCE PRN  traMADol, 50 mg, Q6H PRN  sodium chloride flush, 5-40 mL, PRN  sodium chloride, , PRN  potassium chloride, 40 mEq, PRN   Or  potassium alternative oral replacement, 40 mEq, PRN   Or  potassium chloride, 10 mEq, PRN  magnesium sulfate, 2,000 mg, PRN  ondansetron, 4 mg, Q8H PRN   Or  ondansetron, 4 mg, Q6H PRN  polyethylene glycol, 17 g, Daily PRN  acetaminophen, 650 mg, Q6H PRN   Or  acetaminophen, 650 mg, Q6H PRN         Objective:    /62   Pulse 67   Temp 97.9 °F (36.6 °C)   Resp 18   Ht 1.803 m (5' 11\")   Wt (!) 154.3 kg (340 lb 2.7 oz)   SpO2 100%   BMI 47.44 kg/m²     General Appearance: alert and oriented to person, place and time and in no acute distress  Skin: warm and dry  Head: normocephalic and atraumatic  Eyes: pupils equal, round, and reactive to light, extraocular eye movements intact, conjunctivae normal  Neck: neck supple and non  tender without mass   Pulmonary/Chest: clear to auscultation bilaterally- no wheezes, rales or rhonchi, normal air movement, no respiratory distress  Cardiovascular: normal rate, normal S1 and S2 and no carotid bruits  Abdomen: soft, non-tender, non-distended, normal bowel sounds, no masses or organomegaly  Extremities: no cyanosis, no clubbing and no edema  Neurologic: no cranial nerve deficit and speech normal        Recent Labs     06/04/24  0315 06/05/24  0543    140   K 4.0 3.8    102   CO2 29 32*   BUN 18 11   CREATININE 0.6* 0.5*   GLUCOSE 128* 134*   CALCIUM 8.7 8.6       Recent Labs     06/04/24  0315 06/05/24  0543   WBC 8.5 6.9   RBC 3.72* 3.51*   HGB 11.0* 10.2*   HCT 35.7* 32.8*   MCV 96.0 93.4   MCH 29.6 29.1   MCHC 30.8* 31.1*   RDW 14.1 14.0    267   MPV 9.8 9.3       Radiology:     Assessment:    Principal Problem:    Sacral wound  Active Problems:    Elevated troponin    Hypokalemia  Resolved Problems:    * No resolved hospital problems. *      Sacral wound - Watch off of antibiotics. ID and wound care on board.  Acute hypoxic respiratory failure - Currently on 4 L NC. CTA chest is negative for PE, no indicates of infection. Negative viral respiratory panel. ProBNP is unremarkable.  Pulmonology consulted appreciate recommendations.  There is no right ventricular dilation or pulmonary hypertension noted on 2D echocardiogram get ABG, elevated bicarb suspect this is may be undiagnosed obesity hypoventilation syndrome  NSTEMI - No ischemia noted on EKG. No anginal complaints. F/U with echo and cardiology has been consulted. OP ischemic workup  Essential HTN - Continue norvasc  DVT prophylaxis - [employer] for social work request, still waiting for pre-CERT.    NOTE: This report was transcribed using voice recognition software. Every effort was made to ensure accuracy; however, inadvertent computerized transcription errors may be present.  Electronically signed

## 2024-06-06 NOTE — PROGRESS NOTES
risk general pharmacologic stress test.    Cardiac catheterization:     ----------------------------------------------------------------------------------------------------------------------------------------------------------------  IMPRESSION:  Elevated hs-cTnT 180-144 ng/L. Likely acute myocardial injury in the setting of sacral wound/inflammation/hypoxia   Hypokalemia improved  Sacral wound.  Not infected per ID  Elevated inflammatory markers  Hypoxia  Mild anemia Hgb 11.0  Eventration of right hemidiaphragm  Muscular dystrophy/nonambulatory  Hypertension  Type 2 diabetes A1c 6.4%  Severe obesity BMI 47 kg/m²    RECOMMENDATIONS:  Per ID they do not feel sacral wound is infected.  He does have significantly elevated inflammatory markers.  He has no anginal symptoms and no ischemic EKG changes.  Echo showed normal EF was limited quality study no clear wall motion abnormalities.    No further cardiac testing planned  Recommend eventual ischemic evaluation as outpatient  Aggressive risk factor modification  Further care per primary service and consultants  Okay for discharge from cardiology standpoint when okay with others  Will be available as needed, please call with questions    Hao Mckeon MD, [facility]    NOTE: This report was transcribed using voice recognition software. Every effort was made to ensure accuracy; however, inadvertent computerized transcription errors may be present.

## 2024-06-06 NOTE — CARE COORDINATION
Transition of Care-Anticipate auth today for [place]. LETICIA, HENS and envelope updated.    Addendum- Peer to Peer needed for insurance authorization. Physician to call [phone]    This must be done by 1600. Call to Dr. MARTINEZ to update.  Fabio Vigil 1960  MEMBER ID # [id number]    Pepper HINKLEN, RN  [facility]

## 2024-06-06 NOTE — PROGRESS NOTES
Patient presents with gauze on right second toe.  New dressing site.  Nurse asked what happened to his toe and patient stated his daughter came in yesterday and trimmed his nail.  Nurse removed gauze to look at toe.  There is no nail on the toe.

## 2024-06-06 NOTE — CONSULTS
Pulmonary Consultation    Admit Date: 6/2/2024  Requesting Physician: Doris Martinez MD    CC: Hypoxemia    HPI:    63 years old  male with a past medical history significant of morbid obesity and muscular dystrophy for 9 years.  Admitted to [facility] June 2, 2024 for failure to thrive worsening decub ulcer.  During this admission patient required supplemental O2 (unknown lowest pulse ox) up to 4 L of O2.  Pulmonary consult has been requested in that regard.  Arterial blood gas initially performed did not show hypercapnia or hypoxemia?  When patient asked about respiratory symptoms, denies PND's, orthopnea, increased shortness of breath or productive cough.  Denies hemoptysis, chest pain or palpitations.  Denies tobacco abuse.  Denies family history for underlying obstructive lung disease.  Open known history of sleep apnea, however patient's wife acknowledged history of loud snoring and questionable episodes of apneas while sleeping.    PMH:    Past Medical History:   Diagnosis Date    Hypertension     MD (muscular dystrophy) ([facility])     Obesity (BMI 35.0-39.9 without comorbidity) 8/20/2021     PSH:    Past Surgical History:   Procedure Laterality Date    CYST REMOVAL      on tailbone when was a child          Respiratory ROS: no cough, shortness of breath, or wheezing Otherwise, a complete review of systems is undertaken and is negative.  Acknowledge significant weight gain approximately 50 pounds for the last 5 years.    Social History:  Social History     Socioeconomic History    Marital status:      Spouse name: Not on file    Number of children: Not on file    Years of education: Not on file    Highest education level: Not on file   Occupational History    Not on file   Tobacco Use    Smoking status: Never    Smokeless tobacco: Never   Vaping Use    Vaping Use: Never used   Substance and Sexual Activity    Alcohol use: Yes     Comment: Occasionally    Drug use: No

## 2024-06-07 VITALS
DIASTOLIC BLOOD PRESSURE: 74 MMHG | HEIGHT: 71 IN | BODY MASS INDEX: 44.1 KG/M2 | WEIGHT: 315 LBS | RESPIRATION RATE: 16 BRPM | OXYGEN SATURATION: 93 % | HEART RATE: 83 BPM | SYSTOLIC BLOOD PRESSURE: 132 MMHG | TEMPERATURE: 97.8 F

## 2024-06-07 LAB
BASOPHILS # BLD: 0.06 K/UL (ref 0–0.2)
BASOPHILS NFR BLD: 1 % (ref 0–2)
EOSINOPHIL # BLD: 0.3 K/UL (ref 0.05–0.5)
EOSINOPHILS RELATIVE PERCENT: 5 % (ref 0–6)
ERYTHROCYTE [DISTWIDTH] IN BLOOD BY AUTOMATED COUNT: 13.9 % (ref 11.5–15)
HCT VFR BLD AUTO: 34.6 % (ref 37–54)
HGB BLD-MCNC: 10.9 G/DL (ref 12.5–16.5)
IMM GRANULOCYTES # BLD AUTO: 0.09 K/UL (ref 0–0.58)
IMM GRANULOCYTES NFR BLD: 1 % (ref 0–5)
LYMPHOCYTES NFR BLD: 1.38 K/UL (ref 1.5–4)
LYMPHOCYTES RELATIVE PERCENT: 21 % (ref 20–42)
MCH RBC QN AUTO: 28.8 PG (ref 26–35)
MCHC RBC AUTO-ENTMCNC: 31.5 G/DL (ref 32–34.5)
MCV RBC AUTO: 91.3 FL (ref 80–99.9)
MICROORGANISM SPEC CULT: NORMAL
MICROORGANISM SPEC CULT: NORMAL
MONOCYTES NFR BLD: 0.56 K/UL (ref 0.1–0.95)
MONOCYTES NFR BLD: 9 % (ref 2–12)
NEUTROPHILS NFR BLD: 64 % (ref 43–80)
NEUTS SEG NFR BLD: 4.19 K/UL (ref 1.8–7.3)
PLATELET # BLD AUTO: 322 K/UL (ref 130–450)
PMV BLD AUTO: 9.5 FL (ref 7–12)
RBC # BLD AUTO: 3.79 M/UL (ref 3.8–5.8)
SERVICE CMNT-IMP: NORMAL
SERVICE CMNT-IMP: NORMAL
SPECIMEN DESCRIPTION: NORMAL
SPECIMEN DESCRIPTION: NORMAL
WBC OTHER # BLD: 6.6 K/UL (ref 4.5–11.5)

## 2024-06-07 PROCEDURE — 85025 COMPLETE CBC W/AUTO DIFF WBC: CPT

## 2024-06-07 PROCEDURE — 2580000003 HC RX 258: Performed by: INTERNAL MEDICINE

## 2024-06-07 PROCEDURE — 6360000002 HC RX W HCPCS: Performed by: INTERNAL MEDICINE

## 2024-06-07 PROCEDURE — 6370000000 HC RX 637 (ALT 250 FOR IP): Performed by: INTERNAL MEDICINE

## 2024-06-07 PROCEDURE — 99232 SBSQ HOSP IP/OBS MODERATE 35: CPT | Performed by: STUDENT IN AN ORGANIZED HEALTH CARE EDUCATION/TRAINING PROGRAM

## 2024-06-07 RX ADMIN — AMLODIPINE BESYLATE 5 MG: 5 TABLET ORAL at 09:20

## 2024-06-07 RX ADMIN — POLYETHYLENE GLYCOL 3350 17 G: 17 POWDER, FOR SOLUTION ORAL at 09:43

## 2024-06-07 RX ADMIN — Medication: at 09:21

## 2024-06-07 RX ADMIN — MICONAZOLE NITRATE: 20 POWDER TOPICAL at 09:22

## 2024-06-07 RX ADMIN — ENOXAPARIN SODIUM 40 MG: 100 INJECTION SUBCUTANEOUS at 09:21

## 2024-06-07 RX ADMIN — SODIUM CHLORIDE, PRESERVATIVE FREE 10 ML: 5 INJECTION INTRAVENOUS at 09:22

## 2024-06-07 RX ADMIN — MICONAZOLE NITRATE: 2 OINTMENT TOPICAL at 09:21

## 2024-06-07 ASSESSMENT — PAIN SCALES - GENERAL: PAINLEVEL_OUTOF10: 0

## 2024-06-07 NOTE — PLAN OF CARE
Problem: Discharge Planning  Goal: Discharge to home or other facility with appropriate resources  Outcome: Progressing  Flowsheets (Taken 6/6/2024 2023)  Discharge to home or other facility with appropriate resources:   Identify barriers to discharge with patient and caregiver   Arrange for needed discharge resources and transportation as appropriate   Identify discharge learning needs (meds, wound care, etc)   Refer to discharge planning if patient needs post-hospital services based on physician order or complex needs related to functional status, cognitive ability or social support system     Problem: Skin/Tissue Integrity  Goal: Absence of new skin breakdown  Description: 1.  Monitor for areas of redness and/or skin breakdown  2.  Assess vascular access sites hourly  3.  Every 4-6 hours minimum:  Change oxygen saturation probe site  4.  Every 4-6 hours:  If on nasal continuous positive airway pressure, respiratory therapy assess nares and determine need for appliance change or resting period.  Outcome: Progressing     Problem: ABCDS Injury Assessment  Goal: Absence of physical injury  Outcome: Progressing     Problem: Safety - Adult  Goal: Free from fall injury  Outcome: Progressing     Problem: Pain  Goal: Verbalizes/displays adequate comfort level or baseline comfort level  Outcome: Progressing

## 2024-06-07 NOTE — PROGRESS NOTES
Dr. Wledon notified of raya, PERCY order received     Electronically signed by Kennedi Arevalo RN on 6/7/2024 at 2:06 PM

## 2024-06-07 NOTE — PROGRESS NOTES
[facility] Hospitalist Progress Note    Admitting Date and Time: 6/2/2024  9:24 AM  Admit Dx: Hypokalemia [E87.6]  Elevated troponin [R79.89]  Sacral wound, initial encounter [S31.000A]  Wound of sacral region, initial encounter [S31.000A]  Severe sepsis (HCC) [A41.9, R65.20]  Respiratory failure with hypoxia, unspecified chronicity (HCC) [J96.91]    Subjective:  Patient is being followed for Hypokalemia [E87.6]  Elevated troponin [R79.89]  Sacral wound, initial encounter [S31.000A]  Wound of sacral region, initial encounter [S31.000A]  Severe sepsis (HCC) [A41.9, R65.20]  Respiratory failure with hypoxia, unspecified chronicity (HCC) [J96.91]   Pt was seen and examined.     ROS: denies fever, chills, cp, sob, n/v, HA unless stated above.      enoxaparin  40 mg SubCUTAneous BID    balsum peru-castor oil   Topical BID    amLODIPine  5 mg Oral Daily    sodium chloride flush  5-40 mL IntraVENous 2 times per day    miconazole   Topical BID    ammonium lactate   Topical BID    miconazole nitrate   Topical BID     perflutren lipid microspheres, 1.5 mL, ONCE PRN  traMADol, 50 mg, Q6H PRN  sodium chloride flush, 5-40 mL, PRN  sodium chloride, , PRN  potassium chloride, 40 mEq, PRN   Or  potassium alternative oral replacement, 40 mEq, PRN   Or  potassium chloride, 10 mEq, PRN  magnesium sulfate, 2,000 mg, PRN  ondansetron, 4 mg, Q8H PRN   Or  ondansetron, 4 mg, Q6H PRN  polyethylene glycol, 17 g, Daily PRN  acetaminophen, 650 mg, Q6H PRN   Or  acetaminophen, 650 mg, Q6H PRN         Objective:    /74   Pulse 83   Temp 97.8 °F (36.6 °C) (Oral)   Resp 16   Ht 1.803 m (5' 11\")   Wt (!) 152.6 kg (336 lb 6.8 oz)   SpO2 93%   BMI 46.92 kg/m²     General Appearance: alert and oriented to person, place and time and in no acute distress  Skin: warm and dry  Head: normocephalic and atraumatic  Eyes: pupils equal, round, and reactive to light, extraocular eye movements intact, conjunctivae normal  Neck: neck supple  and non tender without mass   Pulmonary/Chest: clear to auscultation bilaterally- no wheezes, rales or rhonchi, normal air movement, no respiratory distress  Cardiovascular: normal rate, normal S1 and S2 and no carotid bruits  Abdomen: soft, non-tender, non-distended, normal bowel sounds, no masses or organomegaly  Extremities: no cyanosis, no clubbing and no edema  Neurologic: no cranial nerve deficit and speech normal        Recent Labs     06/05/24  0543      K 3.8      CO2 32*   BUN 11   CREATININE 0.5*   GLUCOSE 134*   CALCIUM 8.6       Recent Labs     06/05/24  0543 06/07/24  0229   WBC 6.9 6.6   RBC 3.51* 3.79*   HGB 10.2* 10.9*   HCT 32.8* 34.6*   MCV 93.4 91.3   MCH 29.1 28.8   MCHC 31.1* 31.5*   RDW 14.0 13.9    322   MPV 9.3 9.5       Radiology:     Assessment:    Principal Problem:    Sacral wound  Active Problems:    Elevated troponin    Hypokalemia  Resolved Problems:    * No resolved hospital problems. *      Sacral wound - Watch off of antibiotics. ID and wound care on board.  Acute hypoxic respiratory failure - Currently on 4 L NC. CTA chest is negative for PE, no indicates of infection. Negative viral respiratory panel. ProBNP is unremarkable.  Pulmonology consulted appreciate recommendations.  There is no right ventricular dilation or pulmonary hypertension noted on 2D echocardiogram get ABG, elevated bicarb suspect this is may be undiagnosed obesity hypoventilation syndrome  NSTEMI - No ischemia noted on EKG. No anginal complaints. F/U with echo and cardiology has been consulted. OP ischemic workup  Essential HTN - Continue norvasc  DVT prophylaxis - [employer] for social work request, still waiting for pre-CERT.      NOTE: This report was transcribed using voice recognition software. Every effort was made to ensure accuracy; however, inadvertent computerized transcription errors may be present.  Electronically signed by Agustin Weldon DO on 6/7/2024 at

## 2024-06-07 NOTE — CARE COORDINATION
Transition of Care-Auth still pending for [facility] [place]. Patient cannot leave until pre-cert is approved. LETICIA and all paperwork completed in soft chart.    Auth obtained-[facility] to transport at 1500. Family and liaison updated.     Pepper HINKLEN, RN  [facility]

## 2024-06-07 NOTE — DISCHARGE SUMMARY
[facility] Hospitalist Physician Discharge Summary       [facility]  [address] [facility]  [phone]          Activity level: As tolerated     Dispo: SNF       Condition on discharge: Stable     Patient ID:  Fabio Rangel  [id number]  63 y.o.  1960    Admit date: 6/2/2024    Discharge date and time:  6/7/2024  2:11 PM    Admission Diagnoses: Principal Problem:    Sacral wound  Active Problems:    Elevated troponin    Hypokalemia  Resolved Problems:    * No resolved hospital problems. *      Discharge Diagnoses: Principal Problem:    Sacral wound  Active Problems:    Elevated troponin    Hypokalemia  Resolved Problems:    * No resolved hospital problems. *      Consults:  IP CONSULT TO CARDIOLOGY  IP CONSULT TO INFECTIOUS DISEASES  IP CONSULT TO DIETITIAN  IP CONSULT TO PULMONOLOGY    Procedures:     Hospital Course:       63-year-old male admitted for chronic sacral wounds and also hypoxia.  CT chest was negative for acute pulmonary emboli pulmonology consulted ABG obtain this patient likely to have undiagnosed obesity hypoventilation syndrome versus sleep apnea elevated troponin, concern for NSTEMI but no active chest pain no anginal symptoms cardiology consulted echocardiogram unremarkable will need outpatient ischemic workup at discharge patient medically stable to discharge to rehab    Discharge Exam:    General Appearance: alert and oriented to person, place and time and in no acute distress  Skin: warm and dry  Head: normocephalic and atraumatic  Eyes: pupils equal, round, and reactive to light, extraocular eye movements intact, conjunctivae normal  Neck: neck supple and non tender without mass   Pulmonary/Chest: clear to auscultation bilaterally- no wheezes, rales or rhonchi, normal air movement, no respiratory distress  Cardiovascular: normal rate, normal S1 and S2 and no carotid bruits  Abdomen: soft, non-tender, non-distended, normal bowel sounds, no masses

## 2024-06-07 NOTE — PROGRESS NOTES
Pulmonary progress note    Admit Date: 2024  Requesting Physician: Doris Martinez MD    CC: Hypoxemia    HPI:    63 years old  male with a past medical history significant of morbid obesity and muscular dystrophy for 9 years.  Admitted to [facility] 2024 for failure to thrive worsening decub ulcer.  During this admission patient required supplemental O2 (unknown lowest pulse ox) up to 4 L of O2.  Pulmonary consult has been requested in that regard.  Arterial blood gas initially performed did not show hypercapnia or hypoxemia?  When patient asked about respiratory symptoms, denies PND's, orthopnea, increased shortness of breath or productive cough.  Denies hemoptysis, chest pain or palpitations.  Denies tobacco abuse.  Denies family history for underlying obstructive lung disease.  Open known history of sleep apnea, however patient's wife acknowledged history of loud snoring and questionable episodes of apneas while sleeping.    2024: No events overnight, afebrile able to be placed off supplemental O2.  Currently on room air        Medications:     sodium chloride        enoxaparin  40 mg SubCUTAneous BID    balsum peru-castor oil   Topical BID    amLODIPine  5 mg Oral Daily    sodium chloride flush  5-40 mL IntraVENous 2 times per day    miconazole   Topical BID    ammonium lactate   Topical BID    miconazole nitrate   Topical BID         Vitals:    VITALS:  /74   Pulse 83   Temp 97.8 °F (36.6 °C) (Oral)   Resp 16   Ht 1.803 m (5' 11\")   Wt (!) 152.6 kg (336 lb 6.8 oz)   SpO2 93%   BMI 46.92 kg/m²   24HR INTAKE/OUTPUT:    Intake/Output Summary (Last 24 hours) at 2024 1507  Last data filed at 2024 1407  Gross per 24 hour   Intake 190 ml   Output 750 ml   Net -560 ml     CURRENT PULSE OXIMETRY:  SpO2: 93 %  24HR PULSE OXIMETRY RANGE:  SpO2  Av %  Min: 93 %  Max: 93 %      EXAM:  General: No distress.  Morbid obese.  Eyes: PERRL. No sclera  nonspecific bowel gas pattern.  No obstruction. PELVIS: Appendix:  A normal appearing appendix is noted. Bladder:  No acute findings.  No mass. Reproductive:  Unremarkable as visualized. CHEST, ABDOMEN and PELVIS: Intraperitoneal space:  No acute findings.  No significant fluid collection. No free air. Bones/joints:  No acute fracture.  No dislocation. Soft tissues:  No acute findings. Lymph nodes:  No acute findings.  No enlarged lymph nodes.     1.  No evidence of acute pulmonary emboli. 2.  Elevation/eventration of the right hemidiaphragm with some resultant right basilar lung atelectasis.  There is minimal posterior basilar left lower lobe scarring or minimal atelectasis. 3.  Isodense layering within the fundus of the gallbladder suggesting possibly biliary sludge.  The gallbladder is otherwise unremarkable in appearance. 4.  3-4 mm nonobstructing right intrarenal calculi.  No ureteral calculi or obstructive uropathy is noted. 5.  Rectal constipation.  Diverticulosis without evidence of definite acute diverticulitis. Otherwise nonspecific bowel gas pattern. 6.  Otherwise no acute pathology noted.     CTA PULMONARY W CONTRAST    Result Date: 6/2/2024  EXAM: CT Angiography Chest and CT Abdomen and Pelvis With Intravenous Contrast EXAM DATE/TIME: 6/2/2024 1:06 pm CLINICAL HISTORY: ORDERING SYSTEM PROVIDED HISTORY: r/o pe vs pna, hypoxia  TECHNOLOGIST PROVIDED HISTORY:  Reason for exam:->r/o pe vs pna, hypoxia  Additional Contrast?->1; ORDERING SYSTEM PROVIDED HISTORY: significant sacral wound with scrotal involvement, r/o nec fasc  TECHNOLOGIST PROVIDED HISTORY:  Reason for exam:->significant sacral wound with scrotal involvement, r/o nec fasc Additional Contrast?->None  Decision Support Exception - unselect if not a suspected or confirmed emergency medical condition->Emergency Medical Condition (MA) TECHNIQUE: Axial computed tomographic angiography images of the chest and axial computed tomography images of the

## 2024-06-12 NOTE — PROGRESS NOTES
Physician Progress Note      PATIENT:               SARAH CARBAJAL  CSN #:                  [id number]  :                       1960  ADMIT DATE:       2024 9:24 AM  DISCH DATE:        2024 3:24 PM  RESPONDING  PROVIDER #:        Gladys Marti DO          QUERY TEXT:    Patient admitted with sacral ulcer. If possible, please document in progress   notes and discharge summary the location, present on admission status and   stage of the pressure ulcer:    The medical record reflects the following:  Risk Factors: h/o Muscular Dystropy  Clinical Indicators: per ID \"...Sacral decub wound: has pinkish dry   discoloration around. No sign of infection...Stopped antibiotics...\"  Treatment: ID consult, atb stopped    Stage 1:  Non-blanchable erythema of intact skin  Stage 2:  Abrasion, Blister, Partial-thickness skin loss, with exposed dermis  Stage 3:  Full-thickness skin loss with damage or necrosis of subcutaneous   tissue  Stage 4:  Full-thickness skin & soft tissue loss through to underlying muscle,   tendon or bone  Unstageable: Obscured full-thickness skin & tissue loss    Thank you,  Brenda Flores, RN, BSN, CDIS  Clinical Documentation Integrity  [email or web address]  Options provided:  -- Stage 1 Pressure Ulcer of sacrum present on admission  -- Stage 2 Pressure Ulcer of sacrum present on admission  -- Stage 3 Pressure Ulcer of sacrum present on admission  -- Stage 4 Pressure Ulcer of sacrum present on admission  -- Unstageable Pressure Ulcer sacrum present on admission  -- Other - I will add my own diagnosis  -- Disagree - Not applicable / Not valid  -- Disagree - Clinically unable to determine / Unknown  -- Refer to Clinical Documentation Reviewer    PROVIDER RESPONSE TEXT:    This patient has an unstageable pressure ulcer of the sacrum that was present   on admission.    Query created by: Brenda Flores on 6/10/2024 9:29 AM      QUERY TEXT:    Patient admitted with failure to thrive.

## 2024-06-13 NOTE — PROGRESS NOTES
Physician Progress Note      PATIENT:               SARAH CARBAJAL  CSN #:                  [id number]  :                       1960  ADMIT DATE:       2024 9:24 AM  DISCH DATE:        2024 3:24 PM  RESPONDING  PROVIDER #:        Gladys Marti DO          QUERY TEXT:    Patient admitted with failure to thrive. Noted documentation of questionable   NSTEMI per dc summary and myocardial injury per cardiology. If possible,   please document in progress notes and discharge summary if you are evaluating   and/or treating any of the following:    The medical record reflects the following:  Risk Factors: DM  Clinical Indicators: per cardiology \"...Elevated hs-cTnT 180-144 ng/L. Likely   acute myocardial injury in the setting of sacral   wound/inflammation/hypoxia...\", per dc summary \"...CT chest was negative for   acute pulmonary emboli pulmonology consulted ABG obtain this patient likely to   have undiagnosed obesity hypoventilation syndrome versus sleep apnea elevated   troponin, concern for NSTEMI but no active chest pain no anginal symptoms   cardiology consulted echocardiogram unremarkable will need outpatient ischemic   workup at discharge patient medically stable to discharge to rehab.  Treatment: cardiology consult    Thank you,  Brenda Flores RN, BSN, CDIS  Clinical Documentation Integrity  [email or web address]  Options provided:  -- NSTEMI Type 2 confirmed and myocardial injury ruled out  -- Myocardial injury confirmed and NSTEMI ruled out  -- Other - I will add my own diagnosis  -- Disagree - Not applicable / Not valid  -- Disagree - Clinically unable to determine / Unknown  -- Refer to Clinical Documentation Reviewer    PROVIDER RESPONSE TEXT:    After study, NSTEMI Type 2 confirmed and myocardial injury ruled out.    Query created by: Brenda Flores on 2024 8:13 AM      Electronically signed by:  Gladys Marti DO 2024 8:13 AM

## 2024-06-21 ENCOUNTER — TELEPHONE (OUTPATIENT)
Dept: CARDIOLOGY CLINIC | Age: 64
End: 2024-06-21

## 2024-06-24 DIAGNOSIS — R07.9 CHEST PAIN, UNSPECIFIED TYPE: Primary | ICD-10-CM

## 2024-06-24 DIAGNOSIS — R06.02 SHORTNESS OF BREATH: ICD-10-CM

## 2024-06-24 RX ORDER — REGADENOSON 0.08 MG/ML
0.4 INJECTION, SOLUTION INTRAVENOUS
OUTPATIENT
Start: 2024-06-24

## 2024-06-25 ENCOUNTER — TELEPHONE (OUTPATIENT)
Dept: CARDIOLOGY | Age: 64
End: 2024-06-25

## 2024-06-25 NOTE — TELEPHONE ENCOUNTER
Spoke with patients wife to schedule Lexiscan stress test. Patient is bed ridden and currently in rehab. She will call and schedule.  Electronically signed by Mihaela Dahl on 6/25/2024 at 8:49 AM    .

## 2024-07-03 PROBLEM — R79.89 ELEVATED TROPONIN: Status: RESOLVED | Noted: 2021-08-19 | Resolved: 2024-07-03

## 2024-07-09 ENCOUNTER — TELEPHONE (OUTPATIENT)
Dept: SURGERY | Age: 64
End: 2024-07-09

## 2024-07-16 ENCOUNTER — TELEPHONE (OUTPATIENT)
Dept: CARDIOLOGY | Age: 64
End: 2024-07-16

## 2024-07-16 NOTE — TELEPHONE ENCOUNTER
Spoke with Patient's wife Angela patient going home from Rehab on 7/19/24 and will scheduled stress test for patient per Dr. Mckeon's recommendations number given to Echo dept [phone].

## 2024-07-16 NOTE — TELEPHONE ENCOUNTER
SPOKE WITH PATIENT'S WIFE CONCERNING SCHEDULING LEXISCAN STRESS TEST.  PATIENT'S WIFE STATES THAT PATIENT IS BEDRIDDEN AND DOES NOT WANT TO BE SCHEDULE FOR TEST.  ADVISED PATIENT'S WIFE TO CALL OFFICE IF NEEDED.

## 2024-07-17 NOTE — TELEPHONE ENCOUNTER
Patient's wife notified via voicemail to hold off on stress  test and follow up as needed per Dr. Mckeon.

## 2024-07-26 NOTE — DISCHARGE INSTRUCTIONS
Visit Discharge/Physician Orders    Discharge condition: {STABLE/UNSTABLE:255120041}    Assessment of pain at discharge:    Anesthetic used:     Discharge to: {[facility] Wound Discharge To:20937}    Left via:{Left Via:20938}    Accompanied by:  {[id number]}    ECF/[facility]:     Dressing Orders:    Treatment Orders:    [facility] followup visit _____________________________  (Please note your next appointment above and if you are unable to keep, kindly give a 24 hour notice. Thank you.)    Physician signature:__________________________    If you experience any of the following, please call the Wound Care Center during business hours:    * Increase in Pain  * Temperature over 101  * Increase in drainage from your wound  * Drainage with a foul odor  * Bleeding  * Increase in swelling  * Need for compression bandage changes due to slippage, breakthrough drainage.    If you need medical attention outside of the business hours of the [facility] please contact your PCP or go to the nearest emergency room.

## 2024-07-31 ENCOUNTER — HOSPITAL ENCOUNTER (OUTPATIENT)
Dept: WOUND CARE | Age: 64
Discharge: HOME | End: 2024-07-31

## 2025-01-01 ENCOUNTER — HOSPITAL ENCOUNTER (EMERGENCY)
Age: 65
End: 2025-07-27
Attending: STUDENT IN AN ORGANIZED HEALTH CARE EDUCATION/TRAINING PROGRAM
Payer: MEDICARE

## 2025-01-01 VITALS — BODY MASS INDEX: 37.66 KG/M2 | WEIGHT: 270 LBS

## 2025-01-01 DIAGNOSIS — I46.9 CARDIAC ARREST (HCC): Primary | ICD-10-CM

## 2025-01-01 PROCEDURE — 99285 EMERGENCY DEPT VISIT HI MDM: CPT

## 2025-01-01 PROCEDURE — 6360000002 HC RX W HCPCS

## 2025-01-01 PROCEDURE — 2500000003 HC RX 250 WO HCPCS

## 2025-01-01 PROCEDURE — 31500 INSERT EMERGENCY AIRWAY: CPT

## 2025-07-27 NOTE — ED PROVIDER NOTES
program.  Efforts were made to edit the dictations but occasionally words are mis-transcribed.)    Adiel Michelle,  (electronically signed)

## 2025-07-28 NOTE — ED NOTES
Patient received 3mg of Epi and 150mEq of bicarb, in ED patient received 2mg of Epi and 150mEq of Bicarb and 500ml of fluid